# Patient Record
Sex: FEMALE | Race: WHITE | Employment: OTHER | URBAN - METROPOLITAN AREA
[De-identification: names, ages, dates, MRNs, and addresses within clinical notes are randomized per-mention and may not be internally consistent; named-entity substitution may affect disease eponyms.]

---

## 2023-03-06 ENCOUNTER — TELEPHONE (OUTPATIENT)
Dept: PRIMARY CARE | Facility: CLINIC | Age: 68
End: 2023-03-06
Payer: MEDICARE

## 2023-03-06 PROBLEM — R79.89 ELEVATED TSH: Status: ACTIVE | Noted: 2023-03-06

## 2023-03-06 PROBLEM — E78.5 HYPERLIPIDEMIA: Status: ACTIVE | Noted: 2023-03-06

## 2023-03-06 PROBLEM — M54.50 LUMBAGO: Status: ACTIVE | Noted: 2023-03-06

## 2023-03-06 PROBLEM — I10 HYPERTENSION: Status: ACTIVE | Noted: 2023-03-06

## 2023-03-06 PROBLEM — E55.9 VITAMIN D DEFICIENCY: Status: ACTIVE | Noted: 2023-03-06

## 2023-03-06 PROBLEM — R06.00 DYSPNEA: Status: ACTIVE | Noted: 2023-03-06

## 2023-03-06 PROBLEM — E03.9 HYPOTHYROIDISM: Status: ACTIVE | Noted: 2023-03-06

## 2023-03-06 RX ORDER — ATORVASTATIN CALCIUM 20 MG/1
20 TABLET, FILM COATED ORAL DAILY
COMMUNITY
Start: 2021-05-17 | End: 2024-03-06 | Stop reason: ALTCHOICE

## 2023-03-06 RX ORDER — LEVOTHYROXINE SODIUM 50 UG/1
1 TABLET ORAL DAILY
COMMUNITY
Start: 2020-03-30 | End: 2023-05-18

## 2023-03-06 RX ORDER — LOSARTAN POTASSIUM 25 MG/1
1 TABLET ORAL DAILY
COMMUNITY
Start: 2014-04-23 | End: 2023-06-05

## 2023-03-06 RX ORDER — VALACYCLOVIR HYDROCHLORIDE 1 G/1
1 TABLET, FILM COATED ORAL EVERY 12 HOURS PRN
COMMUNITY
Start: 2015-02-05 | End: 2023-11-30

## 2023-03-06 RX ORDER — HYDROCHLOROTHIAZIDE 12.5 MG/1
12.5 CAPSULE ORAL DAILY
COMMUNITY
Start: 2014-11-04 | End: 2023-06-05

## 2023-03-06 RX ORDER — MULTIVITAMIN
TABLET ORAL
COMMUNITY
Start: 2019-04-03

## 2023-03-06 RX ORDER — MV/FA/DHA/EPA/FISH OIL/SAW/GNK 400MCG-200
1 COMBINATION PACKAGE (EA) ORAL DAILY
COMMUNITY
Start: 2018-04-25

## 2023-03-07 ENCOUNTER — OFFICE VISIT (OUTPATIENT)
Dept: PRIMARY CARE | Facility: CLINIC | Age: 68
End: 2023-03-07
Payer: MEDICARE

## 2023-03-07 VITALS
TEMPERATURE: 98.1 F | SYSTOLIC BLOOD PRESSURE: 137 MMHG | HEART RATE: 66 BPM | HEIGHT: 61 IN | WEIGHT: 131 LBS | DIASTOLIC BLOOD PRESSURE: 82 MMHG | OXYGEN SATURATION: 98 % | BODY MASS INDEX: 24.73 KG/M2

## 2023-03-07 DIAGNOSIS — R31.21 ASYMPTOMATIC MICROSCOPIC HEMATURIA: ICD-10-CM

## 2023-03-07 DIAGNOSIS — R26.89 BALANCE PROBLEM: ICD-10-CM

## 2023-03-07 DIAGNOSIS — E78.00 HYPERCHOLESTEROLEMIA: ICD-10-CM

## 2023-03-07 DIAGNOSIS — R29.898 LEFT LEG WEAKNESS: ICD-10-CM

## 2023-03-07 DIAGNOSIS — I10 HYPERTENSION, UNSPECIFIED TYPE: ICD-10-CM

## 2023-03-07 DIAGNOSIS — R29.90 STROKE-LIKE SYMPTOMS: Primary | ICD-10-CM

## 2023-03-07 DIAGNOSIS — R27.8 ACUTE ATAXIA: ICD-10-CM

## 2023-03-07 LAB
POC APPEARANCE, URINE: CLEAR
POC BILIRUBIN, URINE: NEGATIVE
POC BLOOD, URINE: ABNORMAL
POC COLOR, URINE: YELLOW
POC GLUCOSE, URINE: NEGATIVE MG/DL
POC KETONES, URINE: NEGATIVE MG/DL
POC LEUKOCYTES, URINE: NEGATIVE
POC NITRITE,URINE: NEGATIVE
POC PH, URINE: 6.5 PH
POC PROTEIN, URINE: NEGATIVE MG/DL
POC SPECIFIC GRAVITY, URINE: 1.01
POC UROBILINOGEN, URINE: 0.2 EU/DL

## 2023-03-07 PROCEDURE — 99214 OFFICE O/P EST MOD 30 MIN: CPT | Performed by: FAMILY MEDICINE

## 2023-03-07 PROCEDURE — 3075F SYST BP GE 130 - 139MM HG: CPT | Performed by: FAMILY MEDICINE

## 2023-03-07 PROCEDURE — 3079F DIAST BP 80-89 MM HG: CPT | Performed by: FAMILY MEDICINE

## 2023-03-07 PROCEDURE — 81003 URINALYSIS AUTO W/O SCOPE: CPT | Performed by: FAMILY MEDICINE

## 2023-03-07 PROCEDURE — 1036F TOBACCO NON-USER: CPT | Performed by: FAMILY MEDICINE

## 2023-03-07 PROCEDURE — 1159F MED LIST DOCD IN RCRD: CPT | Performed by: FAMILY MEDICINE

## 2023-03-07 PROCEDURE — 1160F RVW MEDS BY RX/DR IN RCRD: CPT | Performed by: FAMILY MEDICINE

## 2023-03-07 RX ORDER — DOXYCYCLINE 50 MG/1
50 TABLET ORAL 2 TIMES DAILY
COMMUNITY

## 2023-03-08 ENCOUNTER — TELEPHONE (OUTPATIENT)
Dept: PRIMARY CARE | Facility: CLINIC | Age: 68
End: 2023-03-08
Payer: MEDICARE

## 2023-03-08 LAB — URINE CULTURE: NORMAL

## 2023-03-08 PROCEDURE — 87086 URINE CULTURE/COLONY COUNT: CPT

## 2023-03-21 PROBLEM — E78.00 HYPERCHOLESTEROLEMIA: Status: ACTIVE | Noted: 2023-01-23

## 2023-03-21 PROBLEM — R26.89 BALANCE PROBLEM: Status: ACTIVE | Noted: 2023-03-21

## 2023-03-21 PROBLEM — R29.90 STROKE-LIKE SYMPTOMS: Status: ACTIVE | Noted: 2023-03-21

## 2023-03-21 PROBLEM — R29.898 LEFT LEG WEAKNESS: Status: ACTIVE | Noted: 2023-03-21

## 2023-03-21 PROBLEM — Z86.0100 HISTORY OF COLONIC POLYPS: Status: ACTIVE | Noted: 2022-07-20

## 2023-03-21 PROBLEM — R27.8 ACUTE ATAXIA: Status: ACTIVE | Noted: 2023-03-21

## 2023-03-21 PROBLEM — R31.21 ASYMPTOMATIC MICROSCOPIC HEMATURIA: Status: ACTIVE | Noted: 2023-03-21

## 2023-03-21 PROBLEM — Z86.010 HISTORY OF COLONIC POLYPS: Status: ACTIVE | Noted: 2022-07-20

## 2023-03-21 RX ORDER — ASPIRIN 81 MG/1
81 TABLET ORAL DAILY
COMMUNITY
End: 2024-03-06 | Stop reason: ALTCHOICE

## 2023-03-21 RX ORDER — CLOBETASOL PROPIONATE 0.05 MG/G
GEL TOPICAL
COMMUNITY
Start: 2022-07-27

## 2023-03-21 RX ORDER — LEVOTHYROXINE SODIUM 50 UG/1
50 TABLET ORAL
COMMUNITY
Start: 2023-02-05 | End: 2023-03-21 | Stop reason: SDUPTHER

## 2023-03-21 RX ORDER — DAPSONE 50 MG/G
GEL TOPICAL 2 TIMES DAILY
COMMUNITY
Start: 2022-05-25

## 2023-03-21 RX ORDER — ATORVASTATIN CALCIUM 10 MG/1
10 TABLET, FILM COATED ORAL
COMMUNITY
Start: 2023-02-05 | End: 2023-09-11 | Stop reason: SDUPTHER

## 2023-03-21 ASSESSMENT — ENCOUNTER SYMPTOMS
OCCASIONAL FEELINGS OF UNSTEADINESS: 0
DEPRESSION: 0
LOSS OF SENSATION IN FEET: 0

## 2023-03-22 NOTE — PATIENT INSTRUCTIONS
Hospital records reviewed  There does not appear to be any sign of a stroke but there was a slight change on your CT scan that is probably related to blood pressure but I think it is worth having you see the neurologist just to be sure  Urinalysis is pretty unremarkable but I will repeat a urine culture to make sure there is no UTI  Follow-up after neurology consult  Please schedule your annual Medicare wellness exam

## 2023-03-22 NOTE — PROGRESS NOTES
Subjective   Patient ID: Qian Sanz is a 68 y.o. female who presents for follow up for stroke .  HPI  Very pleasant 68-year-old with history of hypertension hyperlipidemia awoke in the middle the night with limb weakness symptoms had occurred over the course of about 24 to 48 hours and then became acute with ataxia walking issues difficulty balancing balance issues and left leg weakness went to ER to be evaluated due to risk factors and strong family history of stroke.  Symptoms were very concerning to her.  Reviewed records from [facility] ER stroke work-up was negative except for very mild lacunar infarct possibility on CT.  There is also some hematuria but no urinary frequency or urgency work-up was negative she was observed for time neurology was consulted was discharged home she is here today for follow-up feels well has not had any further symptoms  Review of Systems  Constitutional: no chills, no fever and no night sweats.   Eyes: no blurred vision and no eyesight problems.   ENT: no hearing loss, no nasal congestion, no nasal discharge, no hoarseness and no sore throat.   Cardiovascular: no chest pain, no intermittent leg claudication, no lower extremity edema, no palpitations and no syncope.   Respiratory: no cough, no shortness of breath during exertion, no shortness of breath at rest and no wheezing.   Gastrointestinal: no abdominal pain, no blood in stools, no constipation, no diarrhea, no melena, no nausea, no rectal pain and no vomiting.   Genitourinary: no dysuria, no change in urinary frequency, no urinary hesitancy, no feelings of urinary urgency and no vaginal discharge.   Musculoskeletal: no arthralgias,  no back pain and no myalgias.   Integumentary: no new skin lesions and no rashes.   Neurological: no difficulty walking, no headache, no limb weakness, no numbness and no tingling.   Psychiatric: no anxiety, no depression, no anhedonia and no substance use disorders.   Endocrine: no  "recent weight gain and no recent weight loss.   Hematologic/Lymphatic: no tendency for easy bruising and no swollen glands .  Objective    /82   Pulse 66   Temp 36.7 °C (98.1 °F) (Oral)   Ht 1.549 m (5' 1\")   Wt 59.4 kg (131 lb)   SpO2 98%   BMI 24.75 kg/m²    Physical Exam  The patient appeared well nourished and normally developed. Vital signs as documented. Head exam is unremarkable. No scleral icterus or corneal arcus noted.  Pupils are equal round reactive to light extraocular movements are intact no hemorrhages noted on funduscopic exam mouth mucous membranes are moist no exudates ears canals clear TMs are gray pearly not injected nose no rhinorrhea or epistaxis Neck is without jugular venous distension, thyromegaly, or carotid bruits. Carotid upstrokes are brisk bilaterally. Lungs are clear to auscultation and percussion. Cardiac exam reveals the PMI to be normally sized and situated. Rhythm is regular. First and second heart sounds normal. No murmurs, rubs or gallops. Abdominal exam reveals normal bowel sounds, no masses, no organomegaly and no aortic enlargement. Extremities are nonedematous and both femoral and pedal pulses are normal.  Neurologic exam DTRs are equal bilaterally no focal deficits strength is symmetrical heme lymph no palpable lymph nodes in the neck axilla or groin  Assessment/Plan   Problem List Items Addressed This Visit          Nervous    Acute ataxia    Left leg weakness    Stroke-like symptoms - Primary       Genitourinary    Asymptomatic microscopic hematuria    Relevant Orders    POCT UA Automated manually resulted (Completed)    Urine Culture       Other    Balance problem       Follow-up hospital visit for strokelike symptoms ataxia balance issues and transient limb weakness in a patient with a strong family history of strokes and hyperlipidemia and hypertension.  Stroke work-up was negative.  CT did show some microangiopathic and possible lacunar infarct changes that " I think warrant a neurology evaluation follow-up.  There was some hematuria will repeat urine and send for culture otherwise neurologic exam is completely intact and normal and blood pressure control is good we will continue on statin aspirin and blood pressure at this time  Schedule annual Medicare wellness exam    Nicolette Alvarenga MD

## 2023-04-06 ENCOUNTER — TELEPHONE (OUTPATIENT)
Dept: PRIMARY CARE | Facility: CLINIC | Age: 68
End: 2023-04-06
Payer: MEDICARE

## 2023-04-06 NOTE — TELEPHONE ENCOUNTER
Pt called to tell us to tell you she sent you a my chart msg and to check it.    She also said she stopped the asprin 81 mg EC tablet because she said it was hurting.

## 2023-09-02 DIAGNOSIS — I10 HYPERTENSION, UNSPECIFIED TYPE: ICD-10-CM

## 2023-09-07 RX ORDER — HYDROCHLOROTHIAZIDE 12.5 MG/1
CAPSULE ORAL
Qty: 90 CAPSULE | Refills: 0 | Status: SHIPPED | OUTPATIENT
Start: 2023-09-07 | End: 2023-09-11 | Stop reason: SDUPTHER

## 2023-09-07 RX ORDER — LOSARTAN POTASSIUM 25 MG/1
TABLET ORAL
Qty: 90 TABLET | Refills: 0 | Status: SHIPPED | OUTPATIENT
Start: 2023-09-07 | End: 2023-12-11

## 2023-09-11 ENCOUNTER — OFFICE VISIT (OUTPATIENT)
Dept: PRIMARY CARE | Facility: CLINIC | Age: 68
End: 2023-09-11
Payer: MEDICARE

## 2023-09-11 VITALS
SYSTOLIC BLOOD PRESSURE: 120 MMHG | DIASTOLIC BLOOD PRESSURE: 70 MMHG | HEART RATE: 65 BPM | TEMPERATURE: 97.5 F | WEIGHT: 128 LBS | BODY MASS INDEX: 24.19 KG/M2

## 2023-09-11 DIAGNOSIS — I10 HYPERTENSION, UNSPECIFIED TYPE: ICD-10-CM

## 2023-09-11 DIAGNOSIS — Z12.31 ENCOUNTER FOR SCREENING MAMMOGRAM FOR BREAST CANCER: ICD-10-CM

## 2023-09-11 DIAGNOSIS — Z00.00 MEDICARE ANNUAL WELLNESS VISIT, SUBSEQUENT: Primary | ICD-10-CM

## 2023-09-11 DIAGNOSIS — E03.9 HYPOTHYROIDISM, UNSPECIFIED TYPE: ICD-10-CM

## 2023-09-11 DIAGNOSIS — E78.00 HYPERCHOLESTEROLEMIA: ICD-10-CM

## 2023-09-11 DIAGNOSIS — T14.8XXA PUNCTURE WOUND: ICD-10-CM

## 2023-09-11 DIAGNOSIS — R73.03 PREDIABETES: ICD-10-CM

## 2023-09-11 PROBLEM — E78.5 HYPERLIPIDEMIA: Status: RESOLVED | Noted: 2023-03-06 | Resolved: 2023-09-11

## 2023-09-11 PROCEDURE — 1036F TOBACCO NON-USER: CPT | Performed by: FAMILY MEDICINE

## 2023-09-11 PROCEDURE — 90715 TDAP VACCINE 7 YRS/> IM: CPT | Performed by: FAMILY MEDICINE

## 2023-09-11 PROCEDURE — G0439 PPPS, SUBSEQ VISIT: HCPCS | Performed by: FAMILY MEDICINE

## 2023-09-11 PROCEDURE — 90471 IMMUNIZATION ADMIN: CPT | Performed by: FAMILY MEDICINE

## 2023-09-11 PROCEDURE — 1170F FXNL STATUS ASSESSED: CPT | Performed by: FAMILY MEDICINE

## 2023-09-11 PROCEDURE — 1160F RVW MEDS BY RX/DR IN RCRD: CPT | Performed by: FAMILY MEDICINE

## 2023-09-11 PROCEDURE — 1159F MED LIST DOCD IN RCRD: CPT | Performed by: FAMILY MEDICINE

## 2023-09-11 PROCEDURE — 1126F AMNT PAIN NOTED NONE PRSNT: CPT | Performed by: FAMILY MEDICINE

## 2023-09-11 PROCEDURE — 99213 OFFICE O/P EST LOW 20 MIN: CPT | Performed by: FAMILY MEDICINE

## 2023-09-11 PROCEDURE — 3078F DIAST BP <80 MM HG: CPT | Performed by: FAMILY MEDICINE

## 2023-09-11 PROCEDURE — 3074F SYST BP LT 130 MM HG: CPT | Performed by: FAMILY MEDICINE

## 2023-09-11 RX ORDER — AMOXICILLIN AND CLAVULANATE POTASSIUM 875; 125 MG/1; MG/1
875 TABLET, FILM COATED ORAL 2 TIMES DAILY
Qty: 20 TABLET | Refills: 0 | Status: SHIPPED | OUTPATIENT
Start: 2023-09-11 | End: 2023-09-21

## 2023-09-11 RX ORDER — HYDROCHLOROTHIAZIDE 12.5 MG/1
12.5 CAPSULE ORAL DAILY
Qty: 90 CAPSULE | Refills: 3 | Status: SHIPPED | OUTPATIENT
Start: 2023-09-11

## 2023-09-11 RX ORDER — LEVOTHYROXINE SODIUM 50 UG/1
50 TABLET ORAL DAILY
Qty: 90 TABLET | Refills: 3 | Status: SHIPPED | OUTPATIENT
Start: 2023-09-11

## 2023-09-11 RX ORDER — ATORVASTATIN CALCIUM 10 MG/1
10 TABLET, FILM COATED ORAL DAILY
Qty: 90 TABLET | Refills: 3 | Status: SHIPPED | OUTPATIENT
Start: 2023-09-11 | End: 2024-03-11 | Stop reason: SDUPTHER

## 2023-09-11 ASSESSMENT — PATIENT HEALTH QUESTIONNAIRE - PHQ9
2. FEELING DOWN, DEPRESSED OR HOPELESS: NOT AT ALL
1. LITTLE INTEREST OR PLEASURE IN DOING THINGS: NOT AT ALL
SUM OF ALL RESPONSES TO PHQ9 QUESTIONS 1 AND 2: 0
2. FEELING DOWN, DEPRESSED OR HOPELESS: NOT AT ALL
SUM OF ALL RESPONSES TO PHQ9 QUESTIONS 1 AND 2: 0
1. LITTLE INTEREST OR PLEASURE IN DOING THINGS: NOT AT ALL

## 2023-09-11 ASSESSMENT — ACTIVITIES OF DAILY LIVING (ADL)
DOING_HOUSEWORK: INDEPENDENT
DRESSING: INDEPENDENT
TAKING_MEDICATION: INDEPENDENT
MANAGING_FINANCES: INDEPENDENT
GROCERY_SHOPPING: INDEPENDENT
BATHING: INDEPENDENT

## 2023-09-11 NOTE — PROGRESS NOTES
Subjective   Reason for Visit: Qian Sanz is an 68 y.o. female here for a Medicare Wellness visit.     Past Medical, Surgical, and Family History reviewed and updated in chart.    Reviewed all medications by prescribing practitioner or clinical pharmacist (such as prescriptions, OTCs, herbal therapies and supplements) and documented in the medical record.    Recent puncture wound  Some redness around the area  Tetanus not updated  No fever chills or systemic symptoms  She also feels somewhat lightheaded when she eats nothing that stops her it lasts for just a short time and then she is feeling better if she rests she is fine she is not fainting does not have any chest pain no lateralizing weakness  Puncture wound 1 week ago still red nonhealing  No fever or systemic symptoms  Very active and independent also had Medicare wellness today        Patient Self Assessment of Health Status  Patient Self Assessment: Good    Nutrition and Exercise  Current Diet: Well Balanced Diet  Adequate Fluid Intake: Yes  Caffeine: Yes  Exercise Frequency: Regularly    Functional Ability/Level of Safety  Cognitive Impairment Observed: No cognitive impairment observed  Cognitive Impairment Reported: No cognitive impairment reported by patient or family    Home Safety Risk Factors: None         Patient Care Team:  Nicolette Alvarenga MD as PCP - General  Nicolette Alvarenga MD as PCP - [facility] Attributed Provider     Review of Systems  Constitutional: no chills, no fever and no night sweats.   Eyes: no blurred vision and no eyesight problems.   ENT: no hearing loss, no nasal congestion, no nasal discharge, no hoarseness and no sore throat.   Cardiovascular: no chest pain, no intermittent leg claudication, no lower extremity edema, no palpitations and no syncope.   Respiratory: no cough, no shortness of breath during exertion, no shortness of breath at rest and no wheezing.   Gastrointestinal: no abdominal pain, no blood in stools, no  constipation, no diarrhea, no melena, no nausea, no rectal pain and no vomiting.   Genitourinary: no dysuria, no change in urinary frequency, no urinary hesitancy, no feelings of urinary urgency and no vaginal discharge.   Musculoskeletal: no arthralgias,  no back pain and no myalgias.   Integumentary: no new skin lesions and no rashes.   Neurological: no difficulty walking, no headache, no limb weakness, no numbness and no tingling.   Psychiatric: no anxiety, no depression, no anhedonia and no substance use disorders.   Endocrine: no recent weight gain and no recent weight loss.   Hematologic/Lymphatic: no tendency for easy bruising and no swollen glands .    Medicare Wellness Visit Billing Compliance Not Met    *This is a visual tool to show completion of required items on the day of the visit. Green checks will only appear on the date of visit.      Objective   Vitals:  /70   Pulse 65   Temp 36.4 °C (97.5 °F)   Wt 58.1 kg (128 lb)   BMI 24.19 kg/m²       Physical Exam  The patient appeared well nourished and normally developed. Vital signs as documented. Head exam is unremarkable. No scleral icterus or corneal arcus noted.  Pupils are equal round reactive to light extraocular movements are intact no hemorrhages noted on funduscopic exam mouth mucous membranes are moist no exudates ears canals clear TMs are gray pearly not injected nose no rhinorrhea or epistaxis Neck is without jugular venous distension, thyromegaly, or carotid bruits. Carotid upstrokes are brisk bilaterally. Lungs are clear to auscultation and percussion. Cardiac exam reveals the PMI to be normally sized and situated. Rhythm is regular. First and second heart sounds normal. No murmurs, rubs or gallops. Abdominal exam reveals normal bowel sounds, no masses, no organomegaly and no aortic enlargement. Extremities are nonedematous and both femoral and pedal pulses are normal.  Neurologic exam DTRs are equal bilaterally no focal deficits  strength is symmetrical heme lymph no palpable lymph nodes in the neck axilla or groin  Puncture wound no evidence of cellulitis minimal tenderness  Assessment/Plan   Problem List Items Addressed This Visit       Hypertension    Relevant Medications    hydroCHLOROthiazide (Microzide) 12.5 mg capsule    Other Relevant Orders    Comprehensive Metabolic Panel    Hypothyroidism    Relevant Medications    levothyroxine (Synthroid, Levoxyl) 50 mcg tablet    Other Relevant Orders    TSH    Hypercholesterolemia    Relevant Medications    atorvastatin (Lipitor) 10 mg tablet    Other Relevant Orders    Lipid Panel    Prediabetes    Relevant Orders    Hemoglobin A1C    Medicare annual wellness visit, subsequent - Primary    Current Assessment & Plan     Continue annual exams         Puncture wound    Current Assessment & Plan     Puncture wound  Some inflammation and superficial infection  Possible early cellulitis  Tetanus shot today  Cleaning instructions given          Other Visit Diagnoses       Encounter for screening mammogram for breast cancer        Relevant Orders    BI mammo bilateral screening tomosynthesis

## 2023-09-13 LAB
NON-UH HIE A/G RATIO: 1.4
NON-UH HIE ALB: 4 G/DL (ref 3.4–5)
NON-UH HIE ALK PHOS: 72 UNIT/L (ref 46–116)
NON-UH HIE BILIRUBIN, TOTAL: 1 MG/DL (ref 0.2–1)
NON-UH HIE BUN/CREAT RATIO: 22.5
NON-UH HIE BUN: 18 MG/DL (ref 9–23)
NON-UH HIE CALCIUM: 10.6 MG/DL (ref 8.7–10.4)
NON-UH HIE CALCULATED LDL CHOLESTEROL: 97 MG/DL (ref 60–130)
NON-UH HIE CALCULATED OSMOLALITY: 285 MOSM/KG (ref 275–295)
NON-UH HIE CHLORIDE: 106 MMOL/L (ref 98–107)
NON-UH HIE CHOLESTEROL: 183 MG/DL (ref 100–200)
NON-UH HIE CO2, VENOUS: 30 MMOL/L (ref 20–31)
NON-UH HIE CREATININE: 0.8 MG/DL (ref 0.5–0.8)
NON-UH HIE GFR AA: >60
NON-UH HIE GLOBULIN: 2.8 G/DL
NON-UH HIE GLOMERULAR FILTRATION RATE: >60 ML/MIN/1.73M?
NON-UH HIE GLUCOSE: 89 MG/DL (ref 74–106)
NON-UH HIE GOT: 20 UNIT/L (ref 15–37)
NON-UH HIE GPT: 15 UNIT/L (ref 10–49)
NON-UH HIE HDL CHOLESTEROL: 71 MG/DL (ref 40–60)
NON-UH HIE HGB A1C: 5.2 %
NON-UH HIE K: 3.7 MMOL/L (ref 3.5–5.1)
NON-UH HIE NA: 142 MMOL/L (ref 135–145)
NON-UH HIE TOTAL CHOL/HDL CHOL RATIO: 2.6
NON-UH HIE TOTAL PROTEIN: 6.8 G/DL (ref 5.7–8.2)
NON-UH HIE TRIGLYCERIDES: 75 MG/DL (ref 30–150)
NON-UH HIE TSH: 3.03 UIU/ML (ref 0.55–4.78)

## 2023-09-18 ENCOUNTER — TELEPHONE (OUTPATIENT)
Dept: PRIMARY CARE | Facility: CLINIC | Age: 68
End: 2023-09-18
Payer: MEDICARE

## 2023-09-18 NOTE — TELEPHONE ENCOUNTER
Patient needs the results of her blood work. Patient would like to know if to know if she is due for a mammogram.

## 2023-09-22 NOTE — TELEPHONE ENCOUNTER
Call patient  Her blood work is great and I annotated on the report so that she can read the comments  Her calcium was slightly elevated if she is taking a calcium supplement I would like her to stop taking it and repeat the calcium in a few weeks  The rest of her blood work was great  I put an order for her mammogram in her chart she is due in February

## 2023-09-26 ENCOUNTER — TELEPHONE (OUTPATIENT)
Dept: PRIMARY CARE | Facility: CLINIC | Age: 68
End: 2023-09-26
Payer: MEDICARE

## 2023-09-26 NOTE — TELEPHONE ENCOUNTER
Pt got a message saying she needed PTH tested, she doesn't know what that is and if she needs an order for it.  Please call her

## 2023-09-27 DIAGNOSIS — E83.52 SERUM CALCIUM ELEVATED: ICD-10-CM

## 2023-09-27 LAB — NON-UH HIE I-PTH: 95 PG/ML (ref 18.4–80.1)

## 2023-09-28 PROBLEM — R26.89 BALANCE PROBLEM: Status: RESOLVED | Noted: 2023-03-21 | Resolved: 2023-09-28

## 2023-09-28 PROBLEM — G45.9 TIA (TRANSIENT ISCHEMIC ATTACK): Status: ACTIVE | Noted: 2023-09-28

## 2023-09-28 PROBLEM — D12.6 BENIGN NEOPLASM OF COLON: Status: RESOLVED | Noted: 2022-11-14 | Resolved: 2023-09-28

## 2023-09-28 PROBLEM — R10.31 RIGHT LOWER QUADRANT PAIN: Status: RESOLVED | Noted: 2022-11-14 | Resolved: 2023-09-28

## 2023-09-28 PROBLEM — R29.90 STROKE-LIKE SYMPTOMS: Status: RESOLVED | Noted: 2023-03-21 | Resolved: 2023-09-28

## 2023-09-28 PROBLEM — R27.8 ACUTE ATAXIA: Status: RESOLVED | Noted: 2023-03-21 | Resolved: 2023-09-28

## 2023-09-28 PROBLEM — R29.898 LEFT LEG WEAKNESS: Status: RESOLVED | Noted: 2023-03-21 | Resolved: 2023-09-28

## 2023-09-28 PROBLEM — G45.9 TIA (TRANSIENT ISCHEMIC ATTACK): Status: RESOLVED | Noted: 2023-09-28 | Resolved: 2023-09-28

## 2023-09-28 PROBLEM — D12.6 BENIGN NEOPLASM OF COLON: Status: ACTIVE | Noted: 2022-11-14

## 2023-09-28 PROBLEM — R06.00 DYSPNEA: Status: RESOLVED | Noted: 2023-03-06 | Resolved: 2023-09-28

## 2023-09-28 PROBLEM — T14.8XXA PUNCTURE WOUND: Status: ACTIVE | Noted: 2023-09-28

## 2023-09-28 PROBLEM — Z00.00 MEDICARE ANNUAL WELLNESS VISIT, SUBSEQUENT: Status: ACTIVE | Noted: 2023-09-28

## 2023-09-28 NOTE — ASSESSMENT & PLAN NOTE
Puncture wound  Some inflammation and superficial infection  Possible early cellulitis  Tetanus shot today  Cleaning instructions given

## 2023-09-28 NOTE — PATIENT INSTRUCTIONS
Today you had your annual Medicare wellness exam and you were seen for evaluation of a puncture wound tetanus shot is given  No evidence of infection  I recommend taking the antibiotic  Please call if you need anything

## 2023-10-04 ENCOUNTER — TELEPHONE (OUTPATIENT)
Dept: PRIMARY CARE | Facility: CLINIC | Age: 68
End: 2023-10-04
Payer: MEDICARE

## 2023-10-04 NOTE — TELEPHONE ENCOUNTER
Patient made an appointment for a mammogram Thursday 12th with [facility], however the  system keeps calling her to make an appointment and she wants them to stop.  She also wants to know if she should get a bone density test.  Her last test was 2020 but she is not sure if she needs one with her PTH level.

## 2023-10-15 DIAGNOSIS — E21.3 HYPERPARATHYROIDISM (MULTI): Primary | ICD-10-CM

## 2023-10-16 ENCOUNTER — TELEPHONE (OUTPATIENT)
Dept: PRIMARY CARE | Facility: CLINIC | Age: 68
End: 2023-10-16
Payer: MEDICARE

## 2023-11-15 ENCOUNTER — TELEPHONE (OUTPATIENT)
Dept: PRIMARY CARE | Facility: CLINIC | Age: 68
End: 2023-11-15
Payer: MEDICARE

## 2023-11-15 NOTE — TELEPHONE ENCOUNTER
Patient would like to get a bone density test.  She is afraid she is crossing over the level before osteoporosis.

## 2023-11-16 DIAGNOSIS — Z78.0 POSTMENOPAUSAL STATE: Primary | ICD-10-CM

## 2023-11-21 ENCOUNTER — ANCILLARY PROCEDURE (OUTPATIENT)
Dept: RADIOLOGY | Facility: CLINIC | Age: 68
End: 2023-11-21
Payer: MEDICARE

## 2023-11-21 DIAGNOSIS — Z78.0 POSTMENOPAUSAL STATE: ICD-10-CM

## 2023-11-21 PROCEDURE — 77080 DXA BONE DENSITY AXIAL: CPT

## 2023-11-21 PROCEDURE — 77080 DXA BONE DENSITY AXIAL: CPT | Performed by: STUDENT IN AN ORGANIZED HEALTH CARE EDUCATION/TRAINING PROGRAM

## 2023-11-30 DIAGNOSIS — R31.21 ASYMPTOMATIC MICROSCOPIC HEMATURIA: ICD-10-CM

## 2023-11-30 RX ORDER — VALACYCLOVIR HYDROCHLORIDE 1 G/1
1000 TABLET, FILM COATED ORAL EVERY 12 HOURS PRN
Qty: 14 TABLET | Refills: 0 | Status: SHIPPED | OUTPATIENT
Start: 2023-11-30 | End: 2024-03-29

## 2023-12-10 DIAGNOSIS — I10 HYPERTENSION, UNSPECIFIED TYPE: ICD-10-CM

## 2023-12-11 DIAGNOSIS — I10 HYPERTENSION, UNSPECIFIED TYPE: ICD-10-CM

## 2023-12-11 RX ORDER — LOSARTAN POTASSIUM 25 MG/1
25 TABLET ORAL DAILY
Qty: 90 TABLET | Refills: 0 | Status: SHIPPED | OUTPATIENT
Start: 2023-12-11 | End: 2023-12-13

## 2023-12-13 RX ORDER — LOSARTAN POTASSIUM 25 MG/1
25 TABLET ORAL DAILY
Qty: 90 TABLET | Refills: 0 | Status: SHIPPED | OUTPATIENT
Start: 2023-12-13 | End: 2024-03-11

## 2024-01-24 ENCOUNTER — APPOINTMENT (OUTPATIENT)
Dept: ENDOCRINOLOGY | Facility: CLINIC | Age: 69
End: 2024-01-24
Payer: MEDICARE

## 2024-03-06 ENCOUNTER — OFFICE VISIT (OUTPATIENT)
Dept: PRIMARY CARE | Facility: CLINIC | Age: 69
End: 2024-03-06
Payer: MEDICARE

## 2024-03-06 VITALS
SYSTOLIC BLOOD PRESSURE: 134 MMHG | TEMPERATURE: 98.3 F | BODY MASS INDEX: 24.73 KG/M2 | WEIGHT: 131 LBS | HEIGHT: 61 IN | DIASTOLIC BLOOD PRESSURE: 74 MMHG | HEART RATE: 56 BPM

## 2024-03-06 DIAGNOSIS — Z12.31 ENCOUNTER FOR SCREENING MAMMOGRAM FOR BREAST CANCER: ICD-10-CM

## 2024-03-06 DIAGNOSIS — Z11.59 NEED FOR HEPATITIS C SCREENING TEST: ICD-10-CM

## 2024-03-06 DIAGNOSIS — R39.9 UTI SYMPTOMS: ICD-10-CM

## 2024-03-06 DIAGNOSIS — R73.03 PREDIABETES: ICD-10-CM

## 2024-03-06 DIAGNOSIS — R23.3 EASY BRUISING: Primary | ICD-10-CM

## 2024-03-06 PROBLEM — M67.449 GANGLION OF HAND: Status: ACTIVE | Noted: 2024-03-06

## 2024-03-06 PROBLEM — H91.90 HEARING LOSS: Status: ACTIVE | Noted: 2024-03-06

## 2024-03-06 PROBLEM — M25.559 ARTHRALGIA OF HIP: Status: ACTIVE | Noted: 2024-03-06

## 2024-03-06 PROBLEM — L25.9 CONTACT DERMATITIS: Status: RESOLVED | Noted: 2024-03-06 | Resolved: 2024-03-06

## 2024-03-06 PROBLEM — L72.3 INFECTED SEBACEOUS CYST: Status: RESOLVED | Noted: 2024-03-06 | Resolved: 2024-03-06
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
## 2024-03-06 PROBLEM — S76.319A STRAIN OF HAMSTRING MUSCLE: Status: RESOLVED | Noted: 2024-03-06 | Resolved: 2024-03-06

## 2024-03-06 PROBLEM — R20.2 PARESTHESIAS: Status: RESOLVED | Noted: 2024-03-06 | Resolved: 2024-03-06

## 2024-03-06 PROBLEM — R42 DIZZINESS: Status: RESOLVED | Noted: 2024-03-06 | Resolved: 2024-03-06

## 2024-03-06 PROBLEM — E66.3 OVERWEIGHT WITH BODY MASS INDEX (BMI) 25.0-29.9: Status: RESOLVED | Noted: 2024-03-06 | Resolved: 2024-03-06

## 2024-03-06 PROBLEM — M77.40 METATARSALGIA: Status: RESOLVED | Noted: 2024-03-06 | Resolved: 2024-03-06

## 2024-03-06 PROBLEM — R42 LIGHTHEADEDNESS: Status: RESOLVED | Noted: 2024-03-06 | Resolved: 2024-03-06

## 2024-03-06 PROBLEM — M25.569 KNEE PAIN: Status: ACTIVE | Noted: 2024-03-06

## 2024-03-06 PROBLEM — M46.1 INFLAMMATION OF SACROILIAC JOINT (CMS-HCC): Status: RESOLVED | Noted: 2024-03-06 | Resolved: 2024-03-06

## 2024-03-06 PROBLEM — L08.9 INFECTED SEBACEOUS CYST: Status: RESOLVED | Noted: 2024-03-06 | Resolved: 2024-03-06

## 2024-03-06 LAB
POC APPEARANCE, URINE: CLEAR
POC BILIRUBIN, URINE: NEGATIVE
POC BLOOD, URINE: ABNORMAL
POC COLOR, URINE: YELLOW
POC GLUCOSE, URINE: NEGATIVE MG/DL
POC KETONES, URINE: NEGATIVE MG/DL
POC LEUKOCYTES, URINE: NEGATIVE
POC NITRITE,URINE: NEGATIVE
POC PH, URINE: 7 PH
POC PROTEIN, URINE: NEGATIVE MG/DL
POC SPECIFIC GRAVITY, URINE: <=1.005
POC UROBILINOGEN, URINE: 0.2 EU/DL

## 2024-03-06 PROCEDURE — 90670 PCV13 VACCINE IM: CPT | Performed by: FAMILY MEDICINE

## 2024-03-06 PROCEDURE — G0009 ADMIN PNEUMOCOCCAL VACCINE: HCPCS | Performed by: FAMILY MEDICINE

## 2024-03-06 PROCEDURE — 1159F MED LIST DOCD IN RCRD: CPT | Performed by: FAMILY MEDICINE

## 2024-03-06 PROCEDURE — 81003 URINALYSIS AUTO W/O SCOPE: CPT | Performed by: FAMILY MEDICINE

## 2024-03-06 PROCEDURE — 87086 URINE CULTURE/COLONY COUNT: CPT

## 2024-03-06 PROCEDURE — 99213 OFFICE O/P EST LOW 20 MIN: CPT | Performed by: FAMILY MEDICINE

## 2024-03-06 PROCEDURE — 1158F ADVNC CARE PLAN TLK DOCD: CPT | Performed by: FAMILY MEDICINE

## 2024-03-06 PROCEDURE — 1160F RVW MEDS BY RX/DR IN RCRD: CPT | Performed by: FAMILY MEDICINE

## 2024-03-06 PROCEDURE — 3078F DIAST BP <80 MM HG: CPT | Performed by: FAMILY MEDICINE

## 2024-03-06 PROCEDURE — 3075F SYST BP GE 130 - 139MM HG: CPT | Performed by: FAMILY MEDICINE

## 2024-03-06 PROCEDURE — 1036F TOBACCO NON-USER: CPT | Performed by: FAMILY MEDICINE

## 2024-03-06 PROCEDURE — 1123F ACP DISCUSS/DSCN MKR DOCD: CPT | Performed by: FAMILY MEDICINE

## 2024-03-06 ASSESSMENT — PATIENT HEALTH QUESTIONNAIRE - PHQ9
SUM OF ALL RESPONSES TO PHQ9 QUESTIONS 1 AND 2: 0
1. LITTLE INTEREST OR PLEASURE IN DOING THINGS: NOT AT ALL
2. FEELING DOWN, DEPRESSED OR HOPELESS: NOT AT ALL

## 2024-03-06 NOTE — PROGRESS NOTES
[address] [facility] 
 
 
 [address] En[address] 69 [address] [address] [phone] Patient: Fredy Farley MRN: [id number] BTE:[id number] Visit Information Date & Time Provider Department Dept. Phone Encounter #  
 4/2/2018  1:30 PM Kamilla Galdamez [phone] [id number] Follow-up Instructions Return in about 4 weeks (around 4/30/2018). Follow-up and Disposition History Your Appointments 5/1/2018  1:50 PM  
[facility] ([address]) Appt Note: 1 mo  follow up   PT  
 [address] Enrique 69 [address] [address] [address] [address] 6/13/2018  9:20 AM  
FOLLOW UP 10 with Crystal Fuller MD  
[facility] ([address]) Appt Note: [address] [id number] [phone] Upcoming Health Maintenance Date Due  
 EYE EXAM RETINAL OR DILATED Q1 11/4/1944 DTaP/Tdap/Td series (1 - Tdap) 11/4/1955 ZOSTER VACCINE AGE 60> 9/4/1994 GLAUCOMA SCREENING Q2Y 11/4/1999 MEDICARE YEARLY EXAM 8/11/2018 HEMOGLOBIN A1C Q6M 9/1/2018 MICROALBUMIN Q1 11/13/2018 FOOT EXAM Q1 3/1/2019 LIPID PANEL Q1 3/1/2019 Allergies as of 4/2/2018  Review Complete On: 4/2/2018 By: Crystal Fuller MD  
 No Known Allergies Current Immunizations  Reviewed on 12/17/2014 Name Date Influenza High Dose Vaccine PF 9/29/2017 Influenza Vaccine 10/25/2016, 10/28/2015, 11/1/2014 Pneumococcal Conjugate (PCV-13) 10/30/2015 Pneumococcal Polysaccharide (PPSV-23) 10/7/2011 Pneumococcal Vaccine (Unspecified Type) 11/1/2011 Not reviewed this visit You Were Diagnosed With   
  
 Codes Comments Hypertension with renal disease    -  Primary ICD-10-CM: I12.9 Subjective   Patient ID: Qian Sanz is a 69 y.o. female who presents for Follow-up (Blood vessels in her hands keep breaking, muscle loss, and left flank pain.  Also randomly gets a muscle pain in her right shin.  No falls or injuries. Parathyroid issues, she saw Endo who recommended surgery to remove her gland. ) and UTI (Urine frequency and slight irritation for a few weeks. ).  Very pleasant 69-year-old for follow-up various issues  Her calcium was high had a visit with an endocrinologist and parathyroid was elevated she is going to be having a follow-up appointment  Her blood sugar was high and there was concern about why that would be and whether or not she had diabetes and she was wondering if any further testing needs to be done  She has noticed that blood vessels are breaking in her hands she is having some easy bruising she is on several medications but is not currently taking a baby aspirin no bleeding from her groin gums no blood in the urine or stool she also has noticed some decreased muscle strength and some chronic left flank pain she also gets occasional pain in her right shin that she was concerned about but it is not stopping her from doing anything she has been very active it was recommended that she has surgery to remove her parathyroid glands  She is also had some slight vaginal irritation and some urinary frequency and UTI symptoms for the past few weeks no fever or chills no blood in the urine but has had urinary frequency        Review of Systems  Constitutional: no chills, no fever and no night sweats.   Eyes: no blurred vision and no eyesight problems.   ENT: no hearing loss, no nasal congestion, no nasal discharge, no hoarseness and no sore throat.   Cardiovascular: no chest pain, no intermittent leg claudication, no lower extremity edema, no palpitations and no syncope.   Respiratory: no cough, no shortness of breath during exertion, no shortness of breath at rest and no  "wheezing.   Gastrointestinal: no abdominal pain, no blood in stools, no constipation, no diarrhea, no melena, no nausea, no rectal pain and no vomiting.   Genitourinary: no dysuria, no change in urinary frequency, no urinary hesitancy, no feelings of urinary urgency and no vaginal discharge.   Musculoskeletal: no arthralgias,  no back pain and no myalgias.   Integumentary: no new skin lesions and no rashes.   Neurological: no difficulty walking, no headache, no limb weakness, no numbness and no tingling.   Psychiatric: no anxiety, no depression, no anhedonia and no substance use disorders.   Endocrine: no recent weight gain and no recent weight loss.   Hematologic/Lymphatic: no tendency for easy bruising and no swollen glands .    Objective    /74   Pulse 56   Temp 36.8 °C (98.3 °F)   Ht 1.549 m (5' 1\")   Wt 59.4 kg (131 lb)   BMI 24.75 kg/m²    Physical Exam  The patient appeared well nourished and normally developed. Vital signs as documented. Head exam is unremarkable. No scleral icterus or corneal arcus noted.  Pupils are equal round reactive to light extraocular movements are intact no hemorrhages noted on funduscopic exam mouth mucous membranes are moist no exudates ears canals clear TMs are gray pearly not injected nose no rhinorrhea or epistaxis Neck is without jugular venous distension, thyromegaly, or carotid bruits. Carotid upstrokes are brisk bilaterally. Lungs are clear to auscultation and percussion. Cardiac exam reveals the PMI to be normally sized and situated. Rhythm is regular. First and second heart sounds normal. No murmurs, rubs or gallops. Abdominal exam reveals normal bowel sounds, no masses, no organomegaly and no aortic enlargement. Extremities are nonedematous and both femoral and pedal pulses are normal.  Neurologic exam DTRs are equal bilaterally no focal deficits strength is symmetrical heme lymph no palpable lymph nodes in the neck axilla or groin    Assessment/Plan   Problem " ICD-9-CM: 403.90 CKD (chronic kidney disease) stage 3, GFR 30-59 ml/min     ICD-10-CM: N18.3 ICD-9-CM: 585. 3 Paroxysmal atrial fibrillation (HCC)     ICD-10-CM: I48.0 ICD-9-CM: 427.31 Cardiomyopathy, unspecified type ([facility].)     ICD-10-CM: I42.9 ICD-9-CM: 425.4 Ischemia of left lower extremity     ICD-10-CM: I99.8 ICD-9-CM: 459.9 Vitals BP Pulse Temp Resp Height(growth percentile) Weight(growth percentile) 124/56 (BP 1 Location: Left arm, BP Patient Position: Sitting) 60 97.6 °F (36.4 °C) (Oral) 15 5' 7\" (1.702 m) 176 lb 3.2 oz (79.9 kg) SpO2 BMI Smoking Status 97% 27.6 kg/m2 Former Smoker Vitals History BMI and BSA Data Body Mass Index Body Surface Area  
 27.6 kg/m 2 1.94 m 2 Preferred Pharmacy Pharmacy Name Phone [id number] [phone] [facility] [phone] Your Updated Medication List  
  
   
This list is accurate as of 4/2/18  1:54 PM.  Always use your most recent med list.  
  
  
  
  
 ascorbic acid (vitamin C) 250 mg tablet Commonly known as:  VITAMIN C  
TAKE 1 TABLET BY MOUTH 3 TIMES A DAY  
  
 atorvastatin 40 mg tablet Commonly known as:  LIPITOR  
TAKE 1 TABLET BY MOUTH EVERY DAY  
  
 carvedilol 3.125 mg tablet Commonly known as:  COREG  
TAKE 1 TABLET BY MOUTH TWICE A DAY  
  
 FABB 2.2-25-1 mg Tab Generic drug:  folic acid-vit [id number] W62 TAKE 1 TABLET BY MOUTH DAILY ferrous sulfate 324 mg (65 mg iron) tablet TAKE 1 TABLET BY MOUTH 3 TIMES DAILY  
  
 furosemide 40 mg tablet Commonly known as:  LASIX TAKE 1 TABLET BY MOUTH DAILY  
  
 levothyroxine 125 mcg tablet Commonly known as:  SYNTHROID Take 125 mcg by mouth Daily (before breakfast). losartan 100 mg tablet Commonly known as:  COZAAR  
TAKE 1 TABLET BY MOUTH EVERY DAY  
  
 warfarin 2.5 mg tablet Commonly known as:  COUMADIN  
 Current dose is 2.5 mg on Monday and Thursday and 5 mg (2 tabs) all other days. Dosing subject to change based on lab results. We Performed the Following AMB POC BASIC METABOLIC PANEL [13347 CPT(R)] AMB POC PT/INR [70011 CPT(R)] Follow-up Instructions Return in about 4 weeks (around 4/30/2018). Patient Instructions Atrial Fibrillation: Care Instructions Your Care Instructions Atrial fibrillation is an irregular and often fast heartbeat. Treating this condition is important for several reasons. It can cause blood clots, which can travel from your heart to your brain and cause a stroke. If you have a fast heartbeat, you may feel lightheaded, dizzy, and weak. An irregular heartbeat can also increase your risk for heart failure. Atrial fibrillation is often the result of another heart condition, such as high blood pressure or coronary artery disease. Making changes to improve your heart condition will help you stay healthy and active. Follow-up care is a key part of your treatment and safety. Be sure to make and go to all appointments, and call your doctor if you are having problems. It's also a good idea to know your test results and keep a list of the medicines you take. How can you care for yourself at home? Medicines ? · Take your medicines exactly as prescribed. Call your doctor if you think you are having a problem with your medicine. You will get more details on the specific medicines your doctor prescribes. ? · If your doctor has given you a blood thinner to prevent a stroke, be sure you get instructions about how to take your medicine safely. Blood thinners can cause serious bleeding problems. ? · Do not take any vitamins, over-the-counter drugs, or herbal products without talking to your doctor first. ? Lifestyle changes ? · Do not smoke.  Smoking can increase your chance of a stroke and heart List Items Addressed This Visit       Prediabetes    Relevant Orders    Hemoglobin A1C    Easy bruising - Primary    Relevant Orders    CBC and Auto Differential    UTI symptoms    Relevant Orders    Urine Culture (Completed)    POCT UA Automated manually resulted (Completed)     Other Visit Diagnoses       Encounter for screening mammogram for breast cancer        Need for hepatitis C screening test        Relevant Orders    Hepatitis C antibody        Easy bruising blood work to evaluate this is most likely age-related physical exam is essentially unremarkable  UTI symptoms send urine for culture  Slightly elevated blood sugar most likely due to the cholesterol issues will check hemoglobin A1c  Regarding the parathyroid issue follow-up with endocrinology  Bone density does show low bone mass do not want that to get worse with the parathyroid issue take calcium and vitamin D and regular physical exercise but proceed with caution due to the parathyroid I recommend a follow-up discussion with the endocrinologist  Otherwise follow-up as needed and can remember to schedule annual wellness exam  Also due for mammogram         Nicolette Alvarenga MD   attack. If you need help quitting, talk to your doctor about stop-smoking programs and medicines. These can increase your chances of quitting for good. ? · Eat a heart-healthy diet. ? · Stay at a healthy weight. Lose weight if you need to.  
? · Limit alcohol to 2 drinks a day for men and 1 drink a day for women. Too much alcohol can cause health problems. ? · Avoid colds and flu. Get a pneumococcal vaccine shot. If you have had one before, ask your doctor whether you need another dose. Get a flu shot every year. If you must be around people with colds or flu, wash your hands often. Activity ? · If your doctor recommends it, get more exercise. Walking is a good choice. Bit by bit, increase the amount you walk every day. Try for at least 30 minutes on most days of the week. You also may want to swim, bike, or do other activities. Your doctor may suggest that you join a cardiac rehabilitation program so that you can have help increasing your physical activity safely. ? · Start light exercise if your doctor says it is okay. Even a small amount will help you get stronger, have more energy, and manage stress. Walking is an easy way to get exercise. Start out by walking a little more than you did in the hospital. Gradually increase the amount you walk. ? · When you exercise, watch for signs that your heart is working too hard. You are pushing too hard if you cannot talk while you are exercising. If you become short of breath or dizzy or have chest pain, sit down and rest immediately. ? · Check your pulse regularly. Place two fingers on the artery at the palm side of your wrist, in line with your thumb. If your heartbeat seems uneven or fast, talk to your doctor. When should you call for help? Call 911 anytime you think you may need emergency care. For example, call if: 
? · You have symptoms of a heart attack. These may include: ¨ Chest pain or pressure, or a strange feeling in the chest. 
¨ Sweating. ¨ Shortness of breath. ¨ Nausea or vomiting. ¨ Pain, pressure, or a strange feeling in the back, neck, jaw, or upper belly or in one or both shoulders or arms. ¨ Lightheadedness or sudden weakness. ¨ A fast or irregular heartbeat. After you call 911, the  may tell you to chew 1 adult-strength or 2 to 4 low-dose aspirin. Wait for an ambulance. Do not try to drive yourself. ? · You have symptoms of a stroke. These may include: 
¨ Sudden numbness, tingling, weakness, or loss of movement in your face, arm, or leg, especially on only one side of your body. ¨ Sudden vision changes. ¨ Sudden trouble speaking. ¨ Sudden confusion or trouble understanding simple statements. ¨ Sudden problems with walking or balance. ¨ A sudden, severe headache that is different from past headaches. ? · You passed out (lost consciousness). ?Call your doctor now or seek immediate medical care if: 
? · You have new or increased shortness of breath. ? · You feel dizzy or lightheaded, or you feel like you may faint. ? · Your heart rate becomes irregular. ? · You can feel your heart flutter in your chest or skip heartbeats. Tell your doctor if these symptoms are new or worse. ? Watch closely for changes in your health, and be sure to contact your doctor if you have any problems. Where can you learn more? Go to http://marci-markus.info/. Enter U020 in the search box to learn more about \"Atrial Fibrillation: Care Instructions. \" Current as of: September 21, 2016 Content Version: 11.4 © 8358-3146 [employer]. Care instructions adapted under license by [employer] (which disclaims liability or warranty for this information). If you have questions about a medical condition or this instruction, always ask your healthcare professional. [address] any warranty or liability for your use of this information. Patient Instructions History Introducing [facility]! Fawn Tinoco introduces THE [employer]OCKLIST patient portal. Now you can access parts of your medical record, email your doctor's office, and request medication refills online. 1. In your internet browser, go to [email or web address] 2. Click on the First Time User? Click Here link in the Sign In box. You will see the New Member Sign Up page. 3. Enter your THE NOCKLIST Access Code exactly as it appears below. You will not need to use this code after youve completed the sign-up process. If you do not sign up before the expiration date, you must request a new code. · [employer]OCKLIST Access Code: [email or web address] Expires: 4/2/2018  3:49 PM 
 
4. Enter the last four digits of your Social Security Number (xxxx) and Date of Birth (mm/dd/yyyy) as indicated and click Submit. You will be taken to the next sign-up page. 5. Create a THE NOCKLIST ID. This will be your [employer]LIST login ID and cannot be changed, so think of one that is secure and easy to remember. 6. Create a [employer]LIST password. You can change your password at any time. 7. Enter your Password Reset Question and Answer. This can be used at a later time if you forget your password. 8. Enter your e-mail address. You will receive e-mail notification when new information is available in [address] 9. Click Sign Up. You can now view and download portions of your medical record. 10. Click the Download Summary menu link to download a portable copy of your medical information. If you have questions, please visit the Frequently Asked Questions section of the THE [employer]LIST website. Remember, THE NOCKLIST is NOT to be used for urgent needs. For medical emergencies, dial 911. Now available from your iPhone and Android! Please provide this summary of care documentation to your next provider. Your primary care clinician is listed as Anibal. If you have any questions after today's visit, please call [phone].

## 2024-03-07 LAB
BACTERIA UR CULT: NORMAL
NON-UH HIE BASO COUNT: 0.03 X1000 (ref 0–0.2)
NON-UH HIE BASOS %: 0.3 %
NON-UH HIE DIFF?: NO
NON-UH HIE EOS COUNT: 0.1 X1000 (ref 0–0.5)
NON-UH HIE EOSIN %: 1.2 %
NON-UH HIE HCT: 40.1 % (ref 36–46)
NON-UH HIE HGB A1C: 5.3 %
NON-UH HIE HGB: 13.6 G/DL (ref 12–16)
NON-UH HIE INSTR WBC: 8.4
NON-UH HIE LYMPH %: 22.7 %
NON-UH HIE LYMPH COUNT: 1.91 X1000 (ref 1.2–4.8)
NON-UH HIE MCH: 31.6 PG (ref 27–34)
NON-UH HIE MCHC: 33.8 G/DL (ref 32–37)
NON-UH HIE MCV: 93.5 FL (ref 80–100)
NON-UH HIE MONO %: 6.2 %
NON-UH HIE MONO COUNT: 0.52 X1000 (ref 0.1–1)
NON-UH HIE MPV: 8.1 FL (ref 7.4–10.4)
NON-UH HIE NEUTROPHIL %: 69.5 %
NON-UH HIE NEUTROPHIL COUNT (ANC): 5.84 X1000 (ref 1.4–8.8)
NON-UH HIE NUCLEATED RBC: 0 /100WBC
NON-UH HIE PLATELET: 285 X10 (ref 150–450)
NON-UH HIE RBC: 4.29 X10 (ref 4.2–5.4)
NON-UH HIE RDW: 12.8 % (ref 11.5–14.5)
NON-UH HIE WBC: 8.4 X10 (ref 4.5–11)

## 2024-03-11 DIAGNOSIS — E78.00 HYPERCHOLESTEROLEMIA: ICD-10-CM

## 2024-03-11 DIAGNOSIS — M54.50 LOW BACK PAIN, UNSPECIFIED BACK PAIN LATERALITY, UNSPECIFIED CHRONICITY, UNSPECIFIED WHETHER SCIATICA PRESENT: Primary | ICD-10-CM

## 2024-03-11 DIAGNOSIS — I10 HYPERTENSION, UNSPECIFIED TYPE: ICD-10-CM

## 2024-03-11 RX ORDER — ATORVASTATIN CALCIUM 10 MG/1
10 TABLET, FILM COATED ORAL DAILY
Qty: 90 TABLET | Refills: 3 | Status: SHIPPED | OUTPATIENT
Start: 2024-03-11

## 2024-03-11 RX ORDER — LOSARTAN POTASSIUM 25 MG/1
25 TABLET ORAL DAILY
Qty: 90 TABLET | Refills: 0 | Status: SHIPPED | OUTPATIENT
Start: 2024-03-11 | End: 2024-06-10

## 2024-03-11 NOTE — TELEPHONE ENCOUNTER
Patient asked to have her losartan 25mg and her atorvastatin 10mg was sent instead. She is wondering if she can have the losartan sent to [facility].

## 2024-03-24 PROBLEM — R23.3 EASY BRUISING: Status: ACTIVE | Noted: 2024-03-24

## 2024-03-24 PROBLEM — R39.9 UTI SYMPTOMS: Status: ACTIVE | Noted: 2024-03-24

## 2024-03-28 DIAGNOSIS — R31.21 ASYMPTOMATIC MICROSCOPIC HEMATURIA: ICD-10-CM

## 2024-03-29 RX ORDER — VALACYCLOVIR HYDROCHLORIDE 1 G/1
1000 TABLET, FILM COATED ORAL EVERY 12 HOURS PRN
Qty: 14 TABLET | Refills: 0 | Status: SHIPPED | OUTPATIENT
Start: 2024-03-29

## 2024-06-09 DIAGNOSIS — I10 HYPERTENSION, UNSPECIFIED TYPE: ICD-10-CM

## 2024-06-10 RX ORDER — LOSARTAN POTASSIUM 25 MG/1
25 TABLET ORAL DAILY
Qty: 90 TABLET | Refills: 0 | Status: SHIPPED | OUTPATIENT
Start: 2024-06-10

## 2024-06-28 DIAGNOSIS — R31.21 ASYMPTOMATIC MICROSCOPIC HEMATURIA: ICD-10-CM

## 2024-06-28 RX ORDER — VALACYCLOVIR HYDROCHLORIDE 1 G/1
1000 TABLET, FILM COATED ORAL EVERY 12 HOURS PRN
Qty: 14 TABLET | Refills: 0 | Status: SHIPPED | OUTPATIENT
Start: 2024-06-28

## 2024-07-12 ENCOUNTER — TELEPHONE (OUTPATIENT)
Dept: PRIMARY CARE | Facility: CLINIC | Age: 69
End: 2024-07-12
Payer: MEDICARE

## 2024-07-12 DIAGNOSIS — M54.50 LOW BACK PAIN, UNSPECIFIED BACK PAIN LATERALITY, UNSPECIFIED CHRONICITY, UNSPECIFIED WHETHER SCIATICA PRESENT: Primary | ICD-10-CM

## 2024-08-14 ENCOUNTER — APPOINTMENT (OUTPATIENT)
Dept: PRIMARY CARE | Facility: CLINIC | Age: 69
End: 2024-08-14
Payer: MEDICARE

## 2024-08-19 ENCOUNTER — APPOINTMENT (OUTPATIENT)
Dept: PRIMARY CARE | Facility: CLINIC | Age: 69
End: 2024-08-19
Payer: MEDICARE

## 2024-08-19 DIAGNOSIS — R20.2 LEG PARESTHESIA: ICD-10-CM

## 2024-08-19 DIAGNOSIS — E83.52 HYPERCALCEMIA: Primary | ICD-10-CM

## 2024-08-19 PROBLEM — M15.9 PRIMARY OSTEOARTHRITIS INVOLVING MULTIPLE JOINTS: Status: ACTIVE | Noted: 2024-08-19

## 2024-08-19 PROBLEM — R79.89 ELEVATED TSH: Status: RESOLVED | Noted: 2023-03-06 | Resolved: 2024-08-19

## 2024-08-19 PROBLEM — E78.2 MIXED HYPERLIPIDEMIA: Status: ACTIVE | Noted: 2023-03-06

## 2024-08-19 PROBLEM — M15.0 PRIMARY OSTEOARTHRITIS INVOLVING MULTIPLE JOINTS: Status: ACTIVE | Noted: 2024-08-19

## 2024-08-19 PROBLEM — E78.00 HYPERCHOLESTEROLEMIA: Status: RESOLVED | Noted: 2023-01-23 | Resolved: 2024-08-19

## 2024-08-19 PROCEDURE — 1036F TOBACCO NON-USER: CPT | Performed by: FAMILY MEDICINE

## 2024-08-19 PROCEDURE — 1158F ADVNC CARE PLAN TLK DOCD: CPT | Performed by: FAMILY MEDICINE

## 2024-08-19 PROCEDURE — 1159F MED LIST DOCD IN RCRD: CPT | Performed by: FAMILY MEDICINE

## 2024-08-19 PROCEDURE — 1123F ACP DISCUSS/DSCN MKR DOCD: CPT | Performed by: FAMILY MEDICINE

## 2024-08-19 PROCEDURE — 99213 OFFICE O/P EST LOW 20 MIN: CPT | Performed by: FAMILY MEDICINE

## 2024-08-19 PROCEDURE — 1160F RVW MEDS BY RX/DR IN RCRD: CPT | Performed by: FAMILY MEDICINE

## 2024-08-19 RX ORDER — ASPIRIN 81 MG/1
81 TABLET ORAL
COMMUNITY
Start: 2023-03-05

## 2024-08-19 RX ORDER — NITROGLYCERIN 0.4 MG/1
0.4 TABLET SUBLINGUAL EVERY 5 MIN PRN
COMMUNITY
Start: 2024-05-13

## 2024-08-19 NOTE — ASSESSMENT & PLAN NOTE
Persistent right paresthesia no weakness  Complete physical therapy if no improvement on therapy follow-up in office to check vitamin B12 and reassess to see if imaging necessary

## 2024-08-19 NOTE — ASSESSMENT & PLAN NOTE
Persistent hypercalcemia concern that it may be from the hydrochlorothiazide  Discontinue hydrochlorothiazide monitor blood pressure recheck in office in September

## 2024-08-19 NOTE — PROGRESS NOTES
Subjective   Patient ID: Qian Sanz is a 69 y.o. female who presents for No chief complaint on file..  A 2 way audio and visual telecommunication device used for virtual visit at patient's request and with her permission  Very pleasant 69-year-old with history of angina hypertension hyperlipidemia and osteoarthritis seen virtually today at her request because she is concerned about her hydrochlorothiazide she has been on it for over 20 years for blood pressure control and her calcium has been persistently high her cardiologist whom she sees regularly recommended taking a half of the tablet of the 25 mg every other day to see if that improves the calcium her blood pressure is running 120/50 and she feels well does not feel lightheaded or dizzy is very concerned about the elevated calcium extensive discussion regarding this issue  Also has been undergoing physical therapy for pain deep in the right Bl[place] has history of sciatica on and off she has a persistent tingling in her right leg down to the foot and arch her legs not weak she has no trouble walking but she describes it as persistently feeling like the baby is buzzing inside her shoe against the bottom of her foot no weakness in the arms or legs no difficulty walking no visual issues        Review of Systems  Constitutional: no chills, no fever and no night sweats.   Eyes: no blurred vision and no eyesight problems.   ENT: no hearing loss, no nasal congestion, no nasal discharge, no hoarseness and no sore throat.   Cardiovascular: no chest pain, no intermittent leg claudication, no lower extremity edema, no palpitations and no syncope.   Respiratory: no cough, no shortness of breath during exertion, no shortness of breath at rest and no wheezing.   Gastrointestinal: no abdominal pain, no blood in stools, no constipation, no diarrhea, no melena, no nausea, no rectal pain and no vomiting.   Genitourinary: no dysuria, no change in urinary frequency, no  urinary hesitancy, no feelings of urinary urgency and no vaginal discharge.   Musculoskeletal: no arthralgias,  no back pain and no myalgias.   Integumentary: no new skin lesions and no rashes.   Neurological: no difficulty walking, no headache, no limb weakness, no numbness and no tingling.   Psychiatric: no anxiety, no depression, no anhedonia and no substance use disorders.   Endocrine: no recent weight gain and no recent weight loss.   Hematologic/Lymphatic: no tendency for easy bruising and no swollen glands .    Objective    There were no vitals taken for this visit.   Physical Exam  The patient appears well and is in absolutely no distress  No conversational dyspnea  No difficulty moving about the room  No accessory muscle use or retractions  Assessment/Plan   Problem List Items Addressed This Visit       Leg paresthesia     Persistent right paresthesia no weakness  Complete physical therapy if no improvement on therapy follow-up in office to check vitamin B12 and reassess to see if imaging necessary         Hypercalcemia - Primary     Persistent hypercalcemia concern that it may be from the hydrochlorothiazide  Discontinue hydrochlorothiazide monitor blood pressure recheck in office in September        Provider impression  12.5 mg of hydrochlorothiazide every day is most likely not contributing much to blood pressure control I am recommending discontinuing it and we will recheck the blood pressure at her regular visit in September  Will recheck the calcium as well I suspect another cause of the elevated calcium may be supplements  Regarding the leg paresthesia this is most likely sciatica if it does not improve with physical therapy will also check vitamin B12 to rule out other causes and also consider imaging of the lower back         Nicolette Alvarenga MD

## 2024-09-03 ENCOUNTER — APPOINTMENT (OUTPATIENT)
Dept: PRIMARY CARE | Facility: CLINIC | Age: 69
End: 2024-09-03
Payer: MEDICARE

## 2024-09-03 VITALS
WEIGHT: 130 LBS | DIASTOLIC BLOOD PRESSURE: 70 MMHG | OXYGEN SATURATION: 98 % | BODY MASS INDEX: 24.56 KG/M2 | SYSTOLIC BLOOD PRESSURE: 128 MMHG | HEART RATE: 61 BPM | TEMPERATURE: 97.3 F

## 2024-09-03 DIAGNOSIS — R20.0 NUMBNESS AND TINGLING OF RIGHT LEG: ICD-10-CM

## 2024-09-03 DIAGNOSIS — I10 PRIMARY HYPERTENSION: ICD-10-CM

## 2024-09-03 DIAGNOSIS — Z00.00 MEDICARE ANNUAL WELLNESS VISIT, SUBSEQUENT: Primary | ICD-10-CM

## 2024-09-03 DIAGNOSIS — E78.2 MIXED HYPERLIPIDEMIA: ICD-10-CM

## 2024-09-03 DIAGNOSIS — R20.2 NUMBNESS AND TINGLING OF RIGHT LEG: ICD-10-CM

## 2024-09-03 DIAGNOSIS — Z11.59 NEED FOR HEPATITIS C SCREENING TEST: ICD-10-CM

## 2024-09-03 RX ORDER — ATORVASTATIN CALCIUM 10 MG/1
10 TABLET, FILM COATED ORAL DAILY
Qty: 90 TABLET | Refills: 3 | Status: SHIPPED | OUTPATIENT
Start: 2024-09-03

## 2024-09-03 ASSESSMENT — PATIENT HEALTH QUESTIONNAIRE - PHQ9
SUM OF ALL RESPONSES TO PHQ9 QUESTIONS 1 AND 2: 0
2. FEELING DOWN, DEPRESSED OR HOPELESS: NOT AT ALL
1. LITTLE INTEREST OR PLEASURE IN DOING THINGS: NOT AT ALL

## 2024-09-03 ASSESSMENT — ENCOUNTER SYMPTOMS: HYPERTENSION: 1

## 2024-09-03 NOTE — PROGRESS NOTES
Subjective   Reason for Visit: Qian Sanz is an 69 y.o. female here for a Medicare Wellness visit.     Past Medical, Surgical, and Family History reviewed and updated in chart.    Reviewed all medications by prescribing practitioner or clinical pharmacist (such as prescriptions, OTCs, herbal therapies and supplements) and documented in the medical record.    Parathyroid  HTN and lipid meds  Right hip pain  Very pleasant 69-year-old here for annual Medicare wellness exam  On statin for hyperlipidemia and losartan and Microzide for hypertension which she is tolerating well is here for follow-up of those conditions and her annual Medicare wellness exam she has been having problems with hypercalciuria and is being evaluated and will be needing her parathyroid removed her last TSH was normal she takes levothyroxine daily 37.5 1-1/2 daily and feels well with no thyroid symptoms since August 27 she has been having some lightheadedness not dizzy no CVA symptoms not listing 1 direction or the other no slurred speech she is some slight swelling in her ankles which is some tingling from her right leg to foot from the buttock to the foot last 3 weeks no injury to her back does have history of arthritis seems to be aggravated by sitting but is relatively chronic    Hypertension  This is a chronic problem. The current episode started more than 1 year ago. The problem has been gradually improving since onset. The problem is controlled. Pertinent negatives include no anxiety, blurred vision, chest pain, headaches, malaise/fatigue, neck pain, orthopnea, palpitations, peripheral edema, PND, shortness of breath or sweats. Agents associated with hypertension include thyroid hormones. Risk factors for coronary artery disease include family history, dyslipidemia and post-menopausal state. Past treatments include angiotensin blockers and lifestyle changes. The current treatment provides significant improvement. There are no  compliance problems.  There is no history of angina, kidney disease, CAD/MI, CVA, heart failure, left ventricular hypertrophy, PVD or retinopathy. Identifiable causes of hypertension include hyperparathyroidism and a thyroid problem. There is no history of chronic renal disease, coarctation of the aorta, hyperaldosteronism, hypercortisolism, a hypertension causing med, pheochromocytoma, renovascular disease or sleep apnea.   Hyperlipidemia  This is a chronic problem. The current episode started more than 1 year ago. The problem is resistant. Recent lipid tests were reviewed and are variable. Exacerbating diseases include hypothyroidism. She has no history of chronic renal disease, diabetes, liver disease, obesity or nephrotic syndrome. There are no known factors aggravating her hyperlipidemia. Pertinent negatives include no chest pain, focal sensory loss, focal weakness, leg pain, myalgias or shortness of breath. Current antihyperlipidemic treatment includes diet change, exercise and statins. The current treatment provides moderate improvement of lipids. There are no compliance problems.  Risk factors for coronary artery disease include dyslipidemia, family history, hypertension and post-menopausal.   Thyroid Problem  Presents for follow-up visit. Patient reports no anxiety, cold intolerance, constipation, depressed mood, diaphoresis, diarrhea, dry skin, fatigue, hair loss, heat intolerance, hoarse voice, leg swelling, menstrual problem, nail problem, palpitations, tremors, visual change, weight gain or weight loss. The symptoms have been stable. Her past medical history is significant for hyperlipidemia. There is no history of diabetes or heart failure.       Patient Self Assessment of Health Status  Patient Self Assessment: Good    Nutrition and Exercise  Current Diet: Well Balanced Diet  Adequate Fluid Intake: Yes  Caffeine: Yes  Exercise Frequency: Regularly    Functional Ability/Level of Safety  Cognitive  Impairment Observed: No cognitive impairment observed  Cognitive Impairment Reported: No cognitive impairment reported by patient or family    Home Safety Risk Factors: None         Patient Care Team:  Nicolette Alvarenga MD as PCP - General  Nicolette Alvarenga MD as PCP - [facility] Attributed Provider     Review of Systems   Constitutional:  Negative for diaphoresis, fatigue, malaise/fatigue, weight gain and weight loss.   HENT:  Negative for hoarse voice.    Eyes:  Negative for blurred vision.   Respiratory:  Negative for shortness of breath.    Cardiovascular:  Negative for chest pain, palpitations, orthopnea and PND.   Gastrointestinal:  Negative for constipation and diarrhea.   Endocrine: Negative for cold intolerance and heat intolerance.   Genitourinary:  Negative for menstrual problem.   Musculoskeletal:  Negative for myalgias and neck pain.   Neurological:  Negative for tremors, focal weakness and headaches.   Psychiatric/Behavioral:  The patient is not nervous/anxious.      Constitutional: no chills, no fever and no night sweats.   Eyes: no blurred vision and no eyesight problems.   ENT: no hearing loss, no nasal congestion, no nasal discharge, no hoarseness and no sore throat.   Cardiovascular: no chest pain, no intermittent leg claudication, no lower extremity edema, no palpitations and no syncope.   Respiratory: no cough, no shortness of breath during exertion, no shortness of breath at rest and no wheezing.   Gastrointestinal: no abdominal pain, no blood in stools, no constipation, no diarrhea, no melena, no nausea, no rectal pain and no vomiting.   Genitourinary: no dysuria, no change in urinary frequency, no urinary hesitancy, no feelings of urinary urgency and no vaginal discharge.   Musculoskeletal: no arthralgias,  no back pain and no myalgias.   Integumentary: no new skin lesions and no rashes.   Neurological: no difficulty walking, no headache, no limb weakness, no numbness and no tingling.    Psychiatric: no anxiety, no depression, no anhedonia and no substance use disorders.   Endocrine: no recent weight gain and no recent weight loss.   Hematologic/Lymphatic: no tendency for easy bruising and no swollen glands .    Medicare Wellness Billing Compliance Satisfied    *This is a visual tool to show completion of required items on the day of the visit. Green checks will only appear on the date of visit.      Objective   Vitals:  /70   Pulse 61   Temp 36.3 °C (97.3 °F)   Wt 59 kg (130 lb)   SpO2 98%   BMI 24.56 kg/m²       Physical Exam  The patient appeared well nourished and normally developed. Vital signs as documented. Head exam is unremarkable. No scleral icterus or corneal arcus noted.  Pupils are equal round reactive to light extraocular movements are intact no hemorrhages noted on funduscopic exam mouth mucous membranes are moist no exudates ears canals clear TMs are gray pearly not injected nose no rhinorrhea or epistaxis Neck is without jugular venous distension, thyromegaly, or carotid bruits. Carotid upstrokes are brisk bilaterally. Lungs are clear to auscultation and percussion. Cardiac exam reveals the PMI to be normally sized and situated. Rhythm is regular. First and second heart sounds normal. No murmurs, rubs or gallops. Abdominal exam reveals normal bowel sounds, no masses, no organomegaly and no aortic enlargement. Extremities are nonedematous and both femoral and pedal pulses are normal.  Neurologic exam DTRs are equal bilaterally no focal deficits strength is symmetrical heme lymph no palpable lymph nodes in the neck axilla or groin    Assessment/Plan   Problem List Items Addressed This Visit       Mixed hyperlipidemia    Current Assessment & Plan     Stable and controlled on atorvastatin  Continue current plan of care         Relevant Medications    atorvastatin (Lipitor) 10 mg tablet    Other Relevant Orders    Lipid panel    C-reactive protein    Primary hypertension     Current Assessment & Plan     Stable and controlled on losartan and hydrochlorothiazide  Continue current plan of care         Relevant Orders    Comprehensive metabolic panel    Medicare annual wellness visit, subsequent - Primary    Current Assessment & Plan     Healthy 69-year-old with multiple medical issues  Recommend RSV vaccine  Recommend flu shot  Schedule mammogram  Continue annual exams         Numbness and tingling of right leg    Current Assessment & Plan     Differential diagnosis includes sciatica arthritis B12 deficiency  Begin physical therapy  X-ray of the hip and lower back  Further plan pending results         Relevant Orders    XR hip right with pelvis when performed 2 or 3 views     Other Visit Diagnoses       Need for hepatitis C screening test        Relevant Orders    Hepatitis C antibody

## 2024-09-04 LAB
NON-UH HIE A/G RATIO: 1.4
NON-UH HIE ALB: 4.2 G/DL (ref 3.4–5)
NON-UH HIE ALK PHOS: 78 UNIT/L (ref 45–117)
NON-UH HIE BILIRUBIN, TOTAL: 0.9 MG/DL (ref 0.3–1.2)
NON-UH HIE BUN/CREAT RATIO: 16.7
NON-UH HIE BUN: 15 MG/DL (ref 9–23)
NON-UH HIE C-REACTIVE PROTEIN, QUANTITATIVE: <0.4 MG/DL (ref 0–0.9)
NON-UH HIE CALCIUM: 10.6 MG/DL (ref 8.7–10.4)
NON-UH HIE CALCULATED LDL CHOLESTEROL: 111 MG/DL (ref 60–130)
NON-UH HIE CALCULATED OSMOLALITY: 280 MOSM/KG (ref 275–295)
NON-UH HIE CHLORIDE: 107 MMOL/L (ref 98–107)
NON-UH HIE CHOLESTEROL: 201 MG/DL (ref 100–200)
NON-UH HIE CO2, VENOUS: 30 MMOL/L (ref 20–31)
NON-UH HIE CREATININE: 0.9 MG/DL (ref 0.5–0.8)
NON-UH HIE GFR AA: >60
NON-UH HIE GLOBULIN: 3.1 G/DL
NON-UH HIE GLOMERULAR FILTRATION RATE: >60 ML/MIN/1.73M?
NON-UH HIE GLUCOSE: 86 MG/DL (ref 74–106)
NON-UH HIE GOT: 21 UNIT/L (ref 15–37)
NON-UH HIE GPT: 13 UNIT/L (ref 10–49)
NON-UH HIE HDL CHOLESTEROL: 74 MG/DL (ref 40–60)
NON-UH HIE HEPATITIS C ANTIBODY: NONREACTIVE
NON-UH HIE K: 4.3 MMOL/L (ref 3.5–5.1)
NON-UH HIE NA: 140 MMOL/L (ref 135–145)
NON-UH HIE TOTAL CHOL/HDL CHOL RATIO: 2.7
NON-UH HIE TOTAL PROTEIN: 7.3 G/DL (ref 5.7–8.2)
NON-UH HIE TRIGLYCERIDES: 80 MG/DL (ref 30–150)

## 2024-09-09 DIAGNOSIS — I10 HYPERTENSION, UNSPECIFIED TYPE: ICD-10-CM

## 2024-09-09 RX ORDER — LOSARTAN POTASSIUM 25 MG/1
25 TABLET ORAL DAILY
Qty: 90 TABLET | Refills: 0 | Status: SHIPPED | OUTPATIENT
Start: 2024-09-09

## 2024-09-14 PROBLEM — T14.8XXA PUNCTURE WOUND: Status: RESOLVED | Noted: 2023-09-28 | Resolved: 2024-09-14

## 2024-09-14 PROBLEM — R39.9 UTI SYMPTOMS: Status: RESOLVED | Noted: 2024-03-24 | Resolved: 2024-09-14

## 2024-09-14 PROBLEM — M25.569 KNEE PAIN: Status: RESOLVED | Noted: 2024-03-06 | Resolved: 2024-09-14

## 2024-09-14 PROBLEM — M25.559 ARTHRALGIA OF HIP: Status: RESOLVED | Noted: 2024-03-06 | Resolved: 2024-09-14

## 2024-09-14 PROBLEM — R20.0 NUMBNESS AND TINGLING OF RIGHT LEG: Status: ACTIVE | Noted: 2024-03-06

## 2024-09-14 ASSESSMENT — PATIENT HEALTH QUESTIONNAIRE - PHQ9
1. LITTLE INTEREST OR PLEASURE IN DOING THINGS: NOT AT ALL
SUM OF ALL RESPONSES TO PHQ9 QUESTIONS 1 AND 2: 0
SUM OF ALL RESPONSES TO PHQ9 QUESTIONS 1 AND 2: 0
1. LITTLE INTEREST OR PLEASURE IN DOING THINGS: NOT AT ALL
2. FEELING DOWN, DEPRESSED OR HOPELESS: NOT AT ALL
2. FEELING DOWN, DEPRESSED OR HOPELESS: NOT AT ALL

## 2024-09-14 ASSESSMENT — ENCOUNTER SYMPTOMS
SWEATS: 0
ORTHOPNEA: 0
MYALGIAS: 0
PALPITATIONS: 0
DRY SKIN: 0
WEIGHT GAIN: 0
HAIR LOSS: 0
CONSTIPATION: 0
LEG PAIN: 0
TREMORS: 0
WEIGHT LOSS: 0
NERVOUS/ANXIOUS: 0
DIARRHEA: 0
DIAPHORESIS: 0
BLURRED VISION: 0
DEPRESSED MOOD: 0
FATIGUE: 0
HEADACHES: 0
HOARSE VOICE: 0
FOCAL SENSORY LOSS: 0
FOCAL WEAKNESS: 0
PND: 0
SHORTNESS OF BREATH: 0
NECK PAIN: 0
VISUAL CHANGE: 0

## 2024-09-14 ASSESSMENT — ACTIVITIES OF DAILY LIVING (ADL)
GROCERY_SHOPPING: INDEPENDENT
MANAGING_FINANCES: INDEPENDENT
DRESSING: INDEPENDENT
TAKING_MEDICATION: INDEPENDENT
DOING_HOUSEWORK: INDEPENDENT
BATHING: INDEPENDENT

## 2024-09-14 NOTE — ASSESSMENT & PLAN NOTE
Differential diagnosis includes sciatica arthritis B12 deficiency  Begin physical therapy  X-ray of the hip and lower back  Further plan pending results

## 2024-09-14 NOTE — ASSESSMENT & PLAN NOTE
Healthy 69-year-old with multiple medical issues  Recommend RSV vaccine  Recommend flu shot  Schedule mammogram  Continue annual exams

## 2024-09-17 ENCOUNTER — OFFICE VISIT (OUTPATIENT)
Dept: PRIMARY CARE | Facility: CLINIC | Age: 69
End: 2024-09-17
Payer: MEDICARE

## 2024-09-17 ENCOUNTER — APPOINTMENT (OUTPATIENT)
Dept: LAB | Facility: LAB | Age: 69
End: 2024-09-17
Payer: MEDICARE

## 2024-09-17 VITALS
HEIGHT: 61 IN | TEMPERATURE: 97.3 F | SYSTOLIC BLOOD PRESSURE: 130 MMHG | HEART RATE: 65 BPM | BODY MASS INDEX: 23.98 KG/M2 | OXYGEN SATURATION: 96 % | DIASTOLIC BLOOD PRESSURE: 70 MMHG | WEIGHT: 127 LBS

## 2024-09-17 DIAGNOSIS — R19.7 DIARRHEA OF PRESUMED INFECTIOUS ORIGIN: Primary | ICD-10-CM

## 2024-09-17 PROCEDURE — 99214 OFFICE O/P EST MOD 30 MIN: CPT | Performed by: FAMILY MEDICINE

## 2024-09-17 PROCEDURE — 3078F DIAST BP <80 MM HG: CPT | Performed by: FAMILY MEDICINE

## 2024-09-17 PROCEDURE — 3075F SYST BP GE 130 - 139MM HG: CPT | Performed by: FAMILY MEDICINE

## 2024-09-17 PROCEDURE — 1159F MED LIST DOCD IN RCRD: CPT | Performed by: FAMILY MEDICINE

## 2024-09-17 PROCEDURE — 3008F BODY MASS INDEX DOCD: CPT | Performed by: FAMILY MEDICINE

## 2024-09-17 PROCEDURE — 1123F ACP DISCUSS/DSCN MKR DOCD: CPT | Performed by: FAMILY MEDICINE

## 2024-09-17 ASSESSMENT — PATIENT HEALTH QUESTIONNAIRE - PHQ9
2. FEELING DOWN, DEPRESSED OR HOPELESS: NOT AT ALL
SUM OF ALL RESPONSES TO PHQ9 QUESTIONS 1 AND 2: 0
1. LITTLE INTEREST OR PLEASURE IN DOING THINGS: NOT AT ALL

## 2024-09-17 NOTE — PROGRESS NOTES
"Subjective   Patient ID: Qian Sanz is a 69 y.o. female who presents for Follow-up (Loose stool x 3 days, ).  HPI    Review of Systems  Constitutional: no chills, no fever and no night sweats.   Eyes: no blurred vision and no eyesight problems.   ENT: no hearing loss, no nasal congestion, no nasal discharge, no hoarseness and no sore throat.   Cardiovascular: no chest pain, no intermittent leg claudication, no lower extremity edema, no palpitations and no syncope.   Respiratory: no cough, no shortness of breath during exertion, no shortness of breath at rest and no wheezing.   Gastrointestinal: no abdominal pain, no blood in stools, no constipation, no diarrhea, no melena, no nausea, no rectal pain and no vomiting.   Genitourinary: no dysuria, no change in urinary frequency, no urinary hesitancy, no feelings of urinary urgency and no vaginal discharge.   Musculoskeletal: no arthralgias,  no back pain and no myalgias.   Integumentary: no new skin lesions and no rashes.   Neurological: no difficulty walking, no headache, no limb weakness, no numbness and no tingling.   Psychiatric: no anxiety, no depression, no anhedonia and no substance use disorders.   Endocrine: no recent weight gain and no recent weight loss.   Hematologic/Lymphatic: no tendency for easy bruising and no swollen glands .    Objective    /70   Pulse 65   Temp 36.3 °C (97.3 °F)   Ht 1.549 m (5' 1\")   Wt 57.6 kg (127 lb)   SpO2 96%   BMI 24.00 kg/m²    Physical Exam  The patient appeared well nourished and normally developed. Vital signs as documented. Head exam is unremarkable. No scleral icterus or corneal arcus noted.  Pupils are equal round reactive to light extraocular movements are intact no hemorrhages noted on funduscopic exam mouth mucous membranes are moist no exudates ears canals clear TMs are gray pearly not injected nose no rhinorrhea or epistaxis Neck is without jugular venous distension, thyromegaly, or carotid " bruits. Carotid upstrokes are brisk bilaterally. Lungs are clear to auscultation and percussion. Cardiac exam reveals the PMI to be normally sized and situated. Rhythm is regular. First and second heart sounds normal. No murmurs, rubs or gallops. Abdominal exam reveals normal bowel sounds, no masses, no organomegaly and no aortic enlargement. Extremities are nonedematous and both femoral and pedal pulses are normal.  Neurologic exam DTRs are equal bilaterally no focal deficits strength is symmetrical heme lymph no palpable lymph nodes in the neck axilla or groin    Assessment/Plan   Problem List Items Addressed This Visit       Diarrhea of presumed infectious origin - Primary    Relevant Orders    Stool Pathogen Panel, PCR    Ova/Para + Giardia/Cryptosporidium Antigen    C. difficile, PCR            Nicolette Alvarenga MD

## 2024-09-18 ENCOUNTER — LAB REQUISITION (OUTPATIENT)
Dept: LAB | Facility: HOSPITAL | Age: 69
End: 2024-09-18
Payer: MEDICARE

## 2024-09-18 DIAGNOSIS — R19.7 DIARRHEA, UNSPECIFIED: ICD-10-CM

## 2024-09-18 LAB
NON-UH HIE C. DIFFICILE TOXIN: NEGATIVE
NON-UH HIE CRYPTOSPORIDIUM ANTIGEN: NEGATIVE
NON-UH HIE GIARDIA ANTIGEN: NEGATIVE
NON-UH HIE OP INT QC: PRESENT

## 2024-09-18 PROCEDURE — 87506 IADNA-DNA/RNA PROBE TQ 6-11: CPT

## 2024-09-19 LAB
C COLI+JEJ+UPSA DNA STL QL NAA+PROBE: DETECTED
EC STX1 GENE STL QL NAA+PROBE: NOT DETECTED
EC STX2 GENE STL QL NAA+PROBE: NOT DETECTED
NON-UH HIE MISC SENDOUT: NORMAL
NOROVIRUS GI + GII RNA STL NAA+PROBE: NOT DETECTED
RV RNA STL NAA+PROBE: NOT DETECTED
SALMONELLA DNA STL QL NAA+PROBE: NOT DETECTED
SHIGELLA DNA SPEC QL NAA+PROBE: NOT DETECTED
V CHOLERAE DNA STL QL NAA+PROBE: NOT DETECTED
Y ENTEROCOL DNA STL QL NAA+PROBE: NOT DETECTED

## 2024-09-20 DIAGNOSIS — A04.5 CAMPYLOBACTER DIARRHEA: Primary | ICD-10-CM

## 2024-09-20 RX ORDER — CIPROFLOXACIN 500 MG/1
500 TABLET ORAL 2 TIMES DAILY
Qty: 20 TABLET | Refills: 0 | Status: SHIPPED | OUTPATIENT
Start: 2024-09-20 | End: 2024-09-30

## 2024-09-25 PROBLEM — R19.7 DIARRHEA OF PRESUMED INFECTIOUS ORIGIN: Status: ACTIVE | Noted: 2024-09-25

## 2024-11-11 DIAGNOSIS — R31.21 ASYMPTOMATIC MICROSCOPIC HEMATURIA: ICD-10-CM

## 2024-11-13 ENCOUNTER — APPOINTMENT (OUTPATIENT)
Dept: SURGERY | Facility: CLINIC | Age: 69
End: 2024-11-13
Payer: MEDICARE

## 2024-11-13 VITALS
HEART RATE: 67 BPM | SYSTOLIC BLOOD PRESSURE: 152 MMHG | DIASTOLIC BLOOD PRESSURE: 78 MMHG | RESPIRATION RATE: 20 BRPM | WEIGHT: 127 LBS | BODY MASS INDEX: 24 KG/M2

## 2024-11-13 DIAGNOSIS — E21.0 PRIMARY HYPERPARATHYROIDISM (MULTI): Primary | ICD-10-CM

## 2024-11-13 DIAGNOSIS — E89.2 S/P PARATHYROIDECTOMY (CMS/HCC): ICD-10-CM

## 2024-11-13 PROCEDURE — 99205 OFFICE O/P NEW HI 60 MIN: CPT | Performed by: SURGERY

## 2024-11-13 PROCEDURE — 1036F TOBACCO NON-USER: CPT | Performed by: SURGERY

## 2024-11-13 PROCEDURE — 1123F ACP DISCUSS/DSCN MKR DOCD: CPT | Performed by: SURGERY

## 2024-11-13 PROCEDURE — 1126F AMNT PAIN NOTED NONE PRSNT: CPT | Performed by: SURGERY

## 2024-11-13 PROCEDURE — 1159F MED LIST DOCD IN RCRD: CPT | Performed by: SURGERY

## 2024-11-13 PROCEDURE — 3077F SYST BP >= 140 MM HG: CPT | Performed by: SURGERY

## 2024-11-13 PROCEDURE — 1160F RVW MEDS BY RX/DR IN RCRD: CPT | Performed by: SURGERY

## 2024-11-13 PROCEDURE — 76536 US EXAM OF HEAD AND NECK: CPT | Performed by: SURGERY

## 2024-11-13 PROCEDURE — 3078F DIAST BP <80 MM HG: CPT | Performed by: SURGERY

## 2024-11-13 RX ORDER — VALACYCLOVIR HYDROCHLORIDE 1 G/1
1000 TABLET, FILM COATED ORAL EVERY 12 HOURS PRN
Qty: 14 TABLET | Refills: 0 | Status: SHIPPED | OUTPATIENT
Start: 2024-11-13

## 2024-11-13 ASSESSMENT — ENCOUNTER SYMPTOMS
CONSTITUTIONAL NEGATIVE: 1
PSYCHIATRIC NEGATIVE: 1
MUSCULOSKELETAL NEGATIVE: 1
EYES NEGATIVE: 1
RESPIRATORY NEGATIVE: 1
NEUROLOGICAL NEGATIVE: 1
CARDIOVASCULAR NEGATIVE: 1
ENDOCRINE NEGATIVE: 1

## 2024-11-13 ASSESSMENT — PAIN SCALES - GENERAL: PAINLEVEL_OUTOF10: 0-NO PAIN

## 2024-11-13 NOTE — PROGRESS NOTES
Subjective   Patient ID: Qian Sanz is a 69 y.o. female who presents for surgical consultation for primary hyperparathyroidism.    HPI I saw Mrs. Sanz in surgery clinic today.  She was referred for surgical consultation for primary hyperparathyroidism.  She had undergone a metabolic workup by medical endocrinology at the [facility].  She has had elevated calcium on 2 occasions ranging from 10.7-10.9 with an associated elevated parathyroid hormone level 74-77 with the upper end of normal for that laboratory being 65.  This is consistent with primary hyperparathyroidism.  Her vitamin D levels ranged from 33-47.    In looking through her history, all of these laboratory tests have been done while she was on hydrochlorothiazide.  In addition, before she saw medical endocrinology she was also taking a calcium supplement.  Therefore I do not see laboratory values that would reflect her calcium level after being off of calcium supplementation and off hydrochlorothiazide.  Both of these could be worsening her hypercalcemia.    They did also send a 24-hour urine calcium level that was normal at 129.  DEXA bone density testing did show some osteoporosis of the femur with a T-score of -2.5.    In terms of symptoms associated with primary hyperparathyroidism, she denies any personal history of kidney stones.  No urinary frequency.  As mentioned normal 24-hour urine calcium level.  Bone-no fractures.  GI-no pancreatitis no peptic ulcer disease no constipation.  Neurocognitive-energy levels are good no depression.    She denies any neck pain.  No real difficulties with breathing or swallowing no troubles with her voice.  No personal history of head neck or chest radiation exposure.    Family history-no hyperparathyroidism.  Her sister does have a history of osteoporosis and is being treated with Fosamax.    Review of Systems   Constitutional: Negative.    HENT: Negative.     Eyes: Negative.    Respiratory:  Negative.     Cardiovascular: Negative.    Endocrine: Negative.    Musculoskeletal: Negative.    Neurological: Negative.    Psychiatric/Behavioral: Negative.         Objective   Physical Exam  Vitals reviewed.   Constitutional:       Appearance: Normal appearance.   Eyes:      Comments: No proptosis   Neck:      Vascular: No carotid bruit.      Comments: Her thyroid feels small.  No palpable nodules.  Trachea midline.  No other palpable neck masses.  Cardiovascular:      Rate and Rhythm: Normal rate and regular rhythm.      Heart sounds: Normal heart sounds.   Pulmonary:      Effort: Pulmonary effort is normal. No respiratory distress.      Breath sounds: Normal breath sounds. No wheezing or rales.   Musculoskeletal:         General: Normal range of motion.   Lymphadenopathy:      Cervical: No cervical adenopathy.   Skin:     General: Skin is warm.   Neurological:      General: No focal deficit present.      Mental Status: She is alert and oriented to person, place, and time.   Psychiatric:         Mood and Affect: Mood normal.         Behavior: Behavior normal.         NM CT parathyroid SPECT  Order: [id number]  Impression    IMPRESSION:    No suspicious sites of abnormal parathyroid tissue identified.            : TERESSA    Transcribe Date/Time: Mar 26 2024  1:01P    Dictated by : PATSY PAGE MD    This examination was interpreted and the report reviewed and  electronically signed by:  PATSY PAGE MD on Mar 26 2024  1:14PM  EST  Narrative    * * *Final Report* * *    DATE OF EXAM: Mar 26 2024 11:56AM      N   [id number]  -  NM PARATHYROID W SPECT/CT  / ACCESSION #  [id number]    PROCEDURE REASON: multiple diagnoses        * * * * Physician Interpretation * * * *     PARATHYROID SCAN ( ):    HISTORY: Hyperparathyroidism.    TECHNIQUE: 390 microcuries of 123-I sodium iodide PO, followed by thyroid   scan. 31 mCi Tc-99m sestamibi was given IV.  SPECT imaging of the neck and chest was  performed; anatomic mapping  noncontrast CT imaging of the same body region was performed using 1 bed  (39cm).    CT Dose-Length Product (DLP): 194 mGy*cm.  CT Dose Reduction Employed: Yes    RESULT:      The I-123 images demonstrate homogeneous uptake of activity by the  thyroid, without focal thyroid abnormalities identified.    SPECT images demonstrate no suspicious focus of residual post-subtraction  sestamibi activity in the neck.    No other areas of abnormal residual post-subtraction sestamibi activity  are seen to suggest other possible sites for abnormal parathyroid tissue.    I did review her sestamibi scan results with her and informed her that up to 15 to 20% of scans can be negative despite patient's having biochemically positive disease for hyperparathyroidism.  Some of this can relate to gland size and/or disease pathology adenoma versus 4 gland hyperplasia.    Patient ID: Qian Sanz is a 69 y.o. female.    General    Date/Time: 11/13/2024 11:39 AM    Performed by: Juan David Man MD  Authorized by: Juan David Man MD    Consent:     Consent obtained:  Verbal    Consent given by:  Patient    Alternatives discussed:  No treatment and observation  Universal protocol:     Procedure explained and questions answered to patient or proxy's satisfaction: yes      Relevant documents present and verified: yes      Test results available: yes    Procedure specific details:      Neck ultrasound    In the office I did a neck ultrasound with a 6-15 MHz linear ultrasound probe to look for possible parathyroid adenoma in a patient with biochemical evidence of primary hyperparathyroidism.  Her thyroid gland is somewhat small which would be consistent with her history of hypothyroidism.  There are no thyroid nodules seen.  On the right side below her thyroid gland there is a very small hypoechoic area which is seen in transverse and sagittal view.  It does have some vascular flow and this could represent a  small blood vessel versus a very small parathyroid gland with a blood vessel going to it.  This is not definitive for a parathyroid adenoma.    Images were captured and reviewed with the patient.  Post-procedure details:     Procedure completion:  Tolerated      Assessment/Plan    Mrs. Sanz has biochemical evidence that is suggestive of mild primary hyperparathyroidism.  While she is on hydrochlorothiazide she has had a mildly elevated calcium level 10.7-10.9 with a parathyroid hormone level above normal 74-77.  This would be consistent with likely primary hyperparathyroidism.  As mentioned these lab values were done while she was on hydrochlorothiazide.  In addition, many of her calcium levels in the past which were high was while she was on calcium supplementation.    She has no real significant symptoms of hyperparathyroidism although she does show evidence of osteoporosis on DEXA bone density testing.    She had a negative sestamibi scan at the [facility] for localization of a solitary parathyroid adenoma.  In office neck ultrasound today is inconclusive for a parathyroid adenoma.  There is a possible small hypoechoic area off the tip of the right thyroid lobe which could represent an adenoma but again this is not definitive.    Plan    1.   she is rather reticent to the idea of surgery at this point.  I told her that her levels are not high in a worrisome fashion on either her calcium or her PTH.  I do think would be beneficial for her to stop her hydrochlorothiazide.  This would certainly help us to distinguish her disease process and likely reduce her serum calcium levels.  I asked her to discontinue her hydrochlorothiazide for 6 weeks.  We will then repeat a calcium PTH and vitamin D level off medication.  She should also avoid any calcium supplementation in the meantime.  She needs to go back on a diuretic, her primary care physician could do something such as spironolactone or Lasix.    2.  I  will contact her once we have results back on her laboratory testing and we can make further decisions.         Juan David Man MD 11/13/24 10:50 AM

## 2024-11-15 ENCOUNTER — HOSPITAL ENCOUNTER (OUTPATIENT)
Dept: RADIOLOGY | Facility: EXTERNAL LOCATION | Age: 69
Discharge: HOME | End: 2024-11-15

## 2024-11-18 ENCOUNTER — TELEPHONE (OUTPATIENT)
Dept: PRIMARY CARE | Facility: CLINIC | Age: 69
End: 2024-11-18
Payer: MEDICARE

## 2024-11-18 DIAGNOSIS — I10 HYPERTENSION, UNSPECIFIED TYPE: ICD-10-CM

## 2024-11-18 DIAGNOSIS — Z12.31 ENCOUNTER FOR SCREENING MAMMOGRAM FOR BREAST CANCER: Primary | ICD-10-CM

## 2024-11-18 NOTE — TELEPHONE ENCOUNTER
Qian called and said if you want her to get a mammogram you will need to put in an order.  She said it has been two years.  Please advise.

## 2024-11-19 RX ORDER — HYDROCHLOROTHIAZIDE 12.5 MG/1
12.5 CAPSULE ORAL DAILY
Qty: 90 CAPSULE | Refills: 1 | Status: SHIPPED | OUTPATIENT
Start: 2024-11-19

## 2024-11-27 ENCOUNTER — TELEPHONE (OUTPATIENT)
Dept: PRIMARY CARE | Facility: CLINIC | Age: 69
End: 2024-11-27
Payer: MEDICARE

## 2024-11-27 ENCOUNTER — APPOINTMENT (OUTPATIENT)
Dept: RADIOLOGY | Facility: CLINIC | Age: 69
End: 2024-11-27
Payer: MEDICARE

## 2024-11-27 NOTE — TELEPHONE ENCOUNTER
Qian was exposed to carbon monoxide but she has had her furnace replaced yesterday.  She is feeling fine but she wants to know if she needs to have her blood checked for carbon monoxide.  Please advise.

## 2024-11-27 NOTE — TELEPHONE ENCOUNTER
Blood test not necessary, just air out the house and spend some time outside away from combustion engines   If asymptomatic, nothing to worry about

## 2024-12-17 DIAGNOSIS — I10 HYPERTENSION, UNSPECIFIED TYPE: ICD-10-CM

## 2024-12-18 RX ORDER — LOSARTAN POTASSIUM 25 MG/1
25 TABLET ORAL DAILY
Qty: 90 TABLET | Refills: 0 | Status: SHIPPED | OUTPATIENT
Start: 2024-12-18

## 2025-01-03 ENCOUNTER — LAB (OUTPATIENT)
Dept: LAB | Facility: LAB | Age: 70
End: 2025-01-03
Payer: MEDICARE

## 2025-01-03 DIAGNOSIS — E21.0 PRIMARY HYPERPARATHYROIDISM (MULTI): ICD-10-CM

## 2025-01-03 LAB
25(OH)D3 SERPL-MCNC: 25 NG/ML (ref 30–100)
CALCIUM SERPL-MCNC: 10.4 MG/DL (ref 8.6–10.3)
PTH-INTACT SERPL-MCNC: 84.6 PG/ML (ref 18.5–88)

## 2025-01-03 PROCEDURE — 82310 ASSAY OF CALCIUM: CPT

## 2025-01-03 PROCEDURE — 82306 VITAMIN D 25 HYDROXY: CPT

## 2025-01-03 PROCEDURE — 83970 ASSAY OF PARATHORMONE: CPT

## 2025-01-23 ENCOUNTER — TELEPHONE (OUTPATIENT)
Dept: SURGICAL ONCOLOGY | Facility: HOSPITAL | Age: 70
End: 2025-01-23
Payer: MEDICARE

## 2025-01-23 DIAGNOSIS — E21.0 PRIMARY HYPERPARATHYROIDISM (MULTI): ICD-10-CM

## 2025-01-23 DIAGNOSIS — E89.2 S/P PARATHYROIDECTOMY (CMS/HCC): ICD-10-CM

## 2025-01-23 NOTE — TELEPHONE ENCOUNTER
Result Communication-I had a good conversation with the patient today.  We repeated her laboratory numbers as listed below off of her hydrochlorothiazide.  With that her calcium remains minimally elevated at 1/10 of a point above the upper end of normal.  Her parathyroid hormone remains in the upper end of the normal range.  Her vitamin D levels are low which could be contributing to some secondary hyperparathyroidism.    I told her I think we can hold off on any surgery at this point.  She does not have a good surgical target.  She had a negative sestamibi scan and an inconclusive ultrasound with me in the office.    I told her she should start taking vitamin D3 2000 units daily.  I will then have my nurse order repeat lab work with calcium, PTH, and vitamin D levels in 3 months and we can monitor her.    She is very comfortable with this plan.  She would like to try to avoid surgery if possible.    Resulted Orders   Calcium   Result Value Ref Range    Calcium 10.4 (H) 8.6 - 10.3 mg/dL   Parathyroid Hormone, Intact   Result Value Ref Range    Parathyroid Hormone, Intact 84.6 18.5 - 88.0 pg/mL   Vitamin D 25-Hydroxy,Total (for eval of Vitamin D levels)   Result Value Ref Range    Vitamin D, 25-Hydroxy, Total 25 (L) 30 - 100 ng/mL    Narrative    Deficiency:         < 20   ng/ml  Insufficiency:      20-29  ng/ml  Sufficiency:         ng/ml  This assay accurately quantifies the sum of Vitamin D3, 25-Hydroxy and Vitamin D2,25-Hydroxy.       5:13 PM      Results were successfully communicated with the patient and they acknowledged their understanding.

## 2025-02-05 ENCOUNTER — OFFICE VISIT (OUTPATIENT)
Dept: PRIMARY CARE | Facility: CLINIC | Age: 70
End: 2025-02-05
Payer: MEDICARE

## 2025-02-05 VITALS
SYSTOLIC BLOOD PRESSURE: 120 MMHG | RESPIRATION RATE: 18 BRPM | BODY MASS INDEX: 23.98 KG/M2 | HEIGHT: 61 IN | WEIGHT: 127 LBS | DIASTOLIC BLOOD PRESSURE: 68 MMHG

## 2025-02-05 DIAGNOSIS — I10 PRIMARY HYPERTENSION: ICD-10-CM

## 2025-02-05 DIAGNOSIS — R31.21 ASYMPTOMATIC MICROSCOPIC HEMATURIA: ICD-10-CM

## 2025-02-05 DIAGNOSIS — G89.29 CHRONIC PAIN OF LEFT KNEE: Primary | ICD-10-CM

## 2025-02-05 DIAGNOSIS — B00.1 HERPES LABIALIS: ICD-10-CM

## 2025-02-05 DIAGNOSIS — M25.562 CHRONIC PAIN OF LEFT KNEE: Primary | ICD-10-CM

## 2025-02-05 DIAGNOSIS — I10 HYPERTENSION, UNSPECIFIED TYPE: ICD-10-CM

## 2025-02-05 PROCEDURE — 3078F DIAST BP <80 MM HG: CPT | Performed by: FAMILY MEDICINE

## 2025-02-05 PROCEDURE — 99214 OFFICE O/P EST MOD 30 MIN: CPT | Performed by: FAMILY MEDICINE

## 2025-02-05 PROCEDURE — 3074F SYST BP LT 130 MM HG: CPT | Performed by: FAMILY MEDICINE

## 2025-02-05 PROCEDURE — 1158F ADVNC CARE PLAN TLK DOCD: CPT | Performed by: FAMILY MEDICINE

## 2025-02-05 PROCEDURE — 1123F ACP DISCUSS/DSCN MKR DOCD: CPT | Performed by: FAMILY MEDICINE

## 2025-02-05 PROCEDURE — 1159F MED LIST DOCD IN RCRD: CPT | Performed by: FAMILY MEDICINE

## 2025-02-05 PROCEDURE — 1160F RVW MEDS BY RX/DR IN RCRD: CPT | Performed by: FAMILY MEDICINE

## 2025-02-05 PROCEDURE — 3008F BODY MASS INDEX DOCD: CPT | Performed by: FAMILY MEDICINE

## 2025-02-05 PROCEDURE — 1036F TOBACCO NON-USER: CPT | Performed by: FAMILY MEDICINE

## 2025-02-05 RX ORDER — LOSARTAN POTASSIUM 25 MG/1
25 TABLET ORAL DAILY
Qty: 90 TABLET | Refills: 3 | Status: SHIPPED | OUTPATIENT
Start: 2025-02-05

## 2025-02-05 RX ORDER — VALACYCLOVIR HYDROCHLORIDE 1 G/1
1000 TABLET, FILM COATED ORAL EVERY 12 HOURS PRN
Qty: 30 TABLET | Refills: 3 | Status: SHIPPED | OUTPATIENT
Start: 2025-02-05

## 2025-02-05 ASSESSMENT — PATIENT HEALTH QUESTIONNAIRE - PHQ9: 1. LITTLE INTEREST OR PLEASURE IN DOING THINGS: NOT AT ALL

## 2025-02-05 ASSESSMENT — ENCOUNTER SYMPTOMS
OCCASIONAL FEELINGS OF UNSTEADINESS: 0
DEPRESSION: 0
LOSS OF SENSATION IN FEET: 0

## 2025-02-05 NOTE — PROGRESS NOTES
"Subjective   Patient ID: Qian Sanz is a 69 y.o. female who presents for Knee Pain (Pain left knee x 1 week - no fall unsure of reason for pain).  HPI    Review of Systems  Constitutional: no chills, no fever and no night sweats.   Eyes: no blurred vision and no eyesight problems.   ENT: no hearing loss, no nasal congestion, no nasal discharge, no hoarseness and no sore throat.   Cardiovascular: no chest pain, no intermittent leg claudication, no lower extremity edema, no palpitations and no syncope.   Respiratory: no cough, no shortness of breath during exertion, no shortness of breath at rest and no wheezing.   Gastrointestinal: no abdominal pain, no blood in stools, no constipation, no diarrhea, no melena, no nausea, no rectal pain and no vomiting.   Genitourinary: no dysuria, no change in urinary frequency, no urinary hesitancy, no feelings of urinary urgency and no vaginal discharge.   Musculoskeletal: no arthralgias,  no back pain and no myalgias.   Integumentary: no new skin lesions and no rashes.   Neurological: no difficulty walking, no headache, no limb weakness, no numbness and no tingling.   Psychiatric: no anxiety, no depression, no anhedonia and no substance use disorders.   Endocrine: no recent weight gain and no recent weight loss.   Hematologic/Lymphatic: no tendency for easy bruising and no swollen glands .    Objective    Resp 18   Ht 1.549 m (5' 1\")   Wt 57.6 kg (127 lb)   BMI 24.00 kg/m²    Physical Exam  The patient appeared well nourished and normally developed. Vital signs as documented. Head exam is unremarkable. No scleral icterus or corneal arcus noted.  Pupils are equal round reactive to light extraocular movements are intact no hemorrhages noted on funduscopic exam mouth mucous membranes are moist no exudates ears canals clear TMs are gray pearly not injected nose no rhinorrhea or epistaxis Neck is without jugular venous distension, thyromegaly, or carotid bruits. Carotid " upstrokes are brisk bilaterally. Lungs are clear to auscultation and percussion. Cardiac exam reveals the PMI to be normally sized and situated. Rhythm is regular. First and second heart sounds normal. No murmurs, rubs or gallops. Abdominal exam reveals normal bowel sounds, no masses, no organomegaly and no aortic enlargement. Extremities are nonedematous and both femoral and pedal pulses are normal.  Neurologic exam DTRs are equal bilaterally no focal deficits strength is symmetrical heme lymph no palpable lymph nodes in the neck axilla or groin pain along joint line     Assessment/Plan   Problem List Items Addressed This Visit    None           Nicolette Alvarenga MD

## 2025-02-24 PROBLEM — Z98.890 POST-OPERATIVE NAUSEA AND VOMITING: Status: RESOLVED | Noted: 2024-12-30 | Resolved: 2025-02-24

## 2025-02-24 PROBLEM — B00.1 HERPES LABIALIS: Status: ACTIVE | Noted: 2025-02-24

## 2025-02-24 PROBLEM — Z86.73 HISTORY OF TIA (TRANSIENT ISCHEMIC ATTACK): Status: ACTIVE | Noted: 2024-12-30

## 2025-02-24 PROBLEM — I25.10 CORONARY ARTERY DISEASE: Status: ACTIVE | Noted: 2024-12-30

## 2025-02-24 PROBLEM — R11.2 POST-OPERATIVE NAUSEA AND VOMITING: Status: RESOLVED | Noted: 2024-12-30 | Resolved: 2025-02-24

## 2025-02-24 PROBLEM — M25.562 CHRONIC PAIN OF LEFT KNEE: Status: ACTIVE | Noted: 2025-02-24

## 2025-02-24 PROBLEM — G89.29 CHRONIC PAIN OF LEFT KNEE: Status: ACTIVE | Noted: 2025-02-24

## 2025-02-25 NOTE — ASSESSMENT & PLAN NOTE
X-ray on my read shows degenerative changes  Consider cortisone shot  Begin physical therapy  Follow-up if no improvement

## 2025-03-16 ASSESSMENT — ENCOUNTER SYMPTOMS
LOSS OF SENSATION: 0
MUSCLE WEAKNESS: 0
INABILITY TO BEAR WEIGHT: 0
TINGLING: 0
LOSS OF MOTION: 1

## 2025-03-18 ENCOUNTER — APPOINTMENT (OUTPATIENT)
Dept: PRIMARY CARE | Facility: CLINIC | Age: 70
End: 2025-03-18
Payer: MEDICARE

## 2025-03-18 VITALS
OXYGEN SATURATION: 98 % | RESPIRATION RATE: 18 BRPM | HEART RATE: 61 BPM | BODY MASS INDEX: 24.2 KG/M2 | SYSTOLIC BLOOD PRESSURE: 128 MMHG | TEMPERATURE: 97.1 F | HEIGHT: 61 IN | DIASTOLIC BLOOD PRESSURE: 80 MMHG | WEIGHT: 128.2 LBS

## 2025-03-18 DIAGNOSIS — G89.29 CHRONIC PAIN OF LEFT KNEE: Primary | ICD-10-CM

## 2025-03-18 DIAGNOSIS — M25.562 CHRONIC PAIN OF LEFT KNEE: Primary | ICD-10-CM

## 2025-03-18 PROBLEM — Z86.0100 PERSONAL HISTORY OF COLON POLYPS, UNSPECIFIED: Status: ACTIVE | Noted: 2025-03-04

## 2025-03-18 PROCEDURE — 3079F DIAST BP 80-89 MM HG: CPT | Performed by: FAMILY MEDICINE

## 2025-03-18 PROCEDURE — 1124F ACP DISCUSS-NO DSCNMKR DOCD: CPT | Performed by: FAMILY MEDICINE

## 2025-03-18 PROCEDURE — 3074F SYST BP LT 130 MM HG: CPT | Performed by: FAMILY MEDICINE

## 2025-03-18 PROCEDURE — 1036F TOBACCO NON-USER: CPT | Performed by: FAMILY MEDICINE

## 2025-03-18 PROCEDURE — 3008F BODY MASS INDEX DOCD: CPT | Performed by: FAMILY MEDICINE

## 2025-03-18 PROCEDURE — 20610 DRAIN/INJ JOINT/BURSA W/O US: CPT | Performed by: FAMILY MEDICINE

## 2025-03-18 PROCEDURE — 1160F RVW MEDS BY RX/DR IN RCRD: CPT | Performed by: FAMILY MEDICINE

## 2025-03-18 PROCEDURE — 99212 OFFICE O/P EST SF 10 MIN: CPT | Performed by: FAMILY MEDICINE

## 2025-03-18 PROCEDURE — 1159F MED LIST DOCD IN RCRD: CPT | Performed by: FAMILY MEDICINE

## 2025-03-18 RX ORDER — TRIAMCINOLONE ACETONIDE 40 MG/ML
40 INJECTION, SUSPENSION INTRA-ARTICULAR; INTRAMUSCULAR ONCE
Status: COMPLETED | OUTPATIENT
Start: 2025-03-18 | End: 2025-03-18

## 2025-03-18 RX ADMIN — TRIAMCINOLONE ACETONIDE 40 MG: 40 INJECTION, SUSPENSION INTRA-ARTICULAR; INTRAMUSCULAR at 09:43

## 2025-03-18 ASSESSMENT — ENCOUNTER SYMPTOMS
DEPRESSION: 0
LOSS OF SENSATION IN FEET: 0
OCCASIONAL FEELINGS OF UNSTEADINESS: 1

## 2025-03-18 NOTE — PROGRESS NOTES
"Subjective   Patient ID: Qian Sanz is a 70 y.o. female who presents for Knee Pain (EPV, Left knee pain x 1 month and half, requesting cortisone injection ).  Lower Extremity Issue  The symptoms are aggravated by movement.       Review of Systems  Constitutional: no chills, no fever and no night sweats.   Eyes: no blurred vision and no eyesight problems.   ENT: no hearing loss, no nasal congestion, no nasal discharge, no hoarseness and no sore throat.   Cardiovascular: no chest pain, no intermittent leg claudication, no lower extremity edema, no palpitations and no syncope.   Respiratory: no cough, no shortness of breath during exertion, no shortness of breath at rest and no wheezing.   Gastrointestinal: no abdominal pain, no blood in stools, no constipation, no diarrhea, no melena, no nausea, no rectal pain and no vomiting.   Genitourinary: no dysuria, no change in urinary frequency, no urinary hesitancy, no feelings of urinary urgency and no vaginal discharge.   Musculoskeletal: no arthralgias,  no back pain and no myalgias.   Integumentary: no new skin lesions and no rashes.   Neurological: no difficulty walking, no headache, no limb weakness, no numbness and no tingling.   Psychiatric: no anxiety, no depression, no anhedonia and no substance use disorders.   Endocrine: no recent weight gain and no recent weight loss.   Hematologic/Lymphatic: no tendency for easy bruising and no swollen glands .    Objective    /80   Pulse 61   Temp 36.2 °C (97.1 °F) (Temporal)   Resp 18   Ht 1.549 m (5' 1\")   Wt 58.2 kg (128 lb 3.2 oz)   SpO2 98%   BMI 24.22 kg/m²    Physical Exam  The patient appeared well nourished and normally developed. Vital signs as documented. Head exam is unremarkable. No scleral icterus or corneal arcus noted.  Pupils are equal round reactive to light extraocular movements are intact no hemorrhages noted on funduscopic exam mouth mucous membranes are moist no exudates ears canals " clear TMs are gray pearly not injected nose no rhinorrhea or epistaxis Neck is without jugular venous distension, thyromegaly, or carotid bruits. Carotid upstrokes are brisk bilaterally. Lungs are clear to auscultation and percussion. Cardiac exam reveals the PMI to be normally sized and situated. Rhythm is regular. First and second heart sounds normal. No murmurs, rubs or gallops. Abdominal exam reveals normal bowel sounds, no masses, no organomegaly and no aortic enlargement. Extremities are nonedematous and both femoral and pedal pulses are normal.  Neurologic exam DTRs are equal bilaterally no focal deficits strength is symmetrical heme lymph no palpable lymph nodes in the neck axilla or groin pain along joint line Patient ID: Qian Sanz is a 70 y.o. female.    Joint Injection Large/Arthrocentesis: L knee on 3/18/2025 9:43 AM  Indications: pain  Details: 24 G needle, medial approach  Medications: 40 mg triamcinolone acetonide (Kenalog-40) injection 40 mg/mL    1 cc Kenalog injected medially into the left knee patient tolerated well          Assessment/Plan   Problem List Items Addressed This Visit       Chronic pain of left knee - Primary    Relevant Medications    triamcinolone acetonide (Kenalog-40) injection 40 mg    Other Relevant Orders    Joint Injection Large/Arthrocentesis: L knee            Nicolette Alvarenga MD

## 2025-03-31 ENCOUNTER — TELEPHONE (OUTPATIENT)
Dept: PRIMARY CARE | Facility: CLINIC | Age: 70
End: 2025-03-31
Payer: MEDICARE

## 2025-03-31 NOTE — TELEPHONE ENCOUNTER
Pt wants to know what kind of knee brace she needs and where to get it from. She is an avid , does she need to stop gardening?

## 2025-04-02 NOTE — TELEPHONE ENCOUNTER
Please call patient I would like her to  a hinged knee brace from drug [place] they sell them on the wall next to the pharmacy

## 2025-04-16 ENCOUNTER — TELEPHONE (OUTPATIENT)
Dept: PRIMARY CARE | Facility: CLINIC | Age: 70
End: 2025-04-16
Payer: MEDICARE

## 2025-04-16 DIAGNOSIS — G89.29 CHRONIC PAIN OF LEFT KNEE: ICD-10-CM

## 2025-04-16 DIAGNOSIS — M79.604 RIGHT LEG PAIN: ICD-10-CM

## 2025-04-16 DIAGNOSIS — G89.29 CHRONIC PAIN OF LEFT KNEE: Primary | ICD-10-CM

## 2025-04-16 DIAGNOSIS — R29.898 BILATERAL LEG WEAKNESS: Primary | ICD-10-CM

## 2025-04-16 DIAGNOSIS — M25.562 CHRONIC PAIN OF LEFT KNEE: Primary | ICD-10-CM

## 2025-04-16 DIAGNOSIS — M25.562 CHRONIC PAIN OF LEFT KNEE: ICD-10-CM

## 2025-04-16 NOTE — TELEPHONE ENCOUNTER
Qian called stating her right knee the above and below muscles are now bothering her and she needs her referral changed so they can work her right leg and her left knee.  Please advise

## 2025-04-16 NOTE — TELEPHONE ENCOUNTER
Problem: Vaginal Birth or  Section  Goal: Fetal and maternal status remain reassuring during the birth process  Description:  Birth OB-Pregnancy care plan goal which identifies if the fetal and maternal status remain reassuring during the birth process  2022 1154 by Joni Urban RN  Outcome: Progressing     Problem: Postpartum  Goal: Experiences normal postpartum course  Description:  Postpartum OB-Pregnancy care plan goal which identifies if the mother is experiencing a normal postpartum course  2022 1154 by Joni Urban RN  Outcome: Progressing     Problem: Postpartum  Goal: Appropriate maternal -  bonding  Description:  Postpartum OB-Pregnancy care plan goal which identifies if the mother and  are bonding appropriately  2022 1154 by Joni Urban RN  Outcome: Progressing     Problem: Postpartum  Goal: Establishment of infant feeding pattern  Description:  Postpartum OB-Pregnancy care plan goal which identifies if the mother is establishing a feeding pattern with their   2022 1154 by Joni Urban RN  Outcome: Progressing     Problem: Postpartum  Goal: Incisions, wounds, or drain sites healing without S/S of infection  2022 1154 by Joni Urban RN  Outcome: Progressing     Problem: Pain  Goal: Verbalizes/displays adequate comfort level or baseline comfort level  2022 1154 by Joni Urban RN  Outcome: Progressing     Problem: Safety - Adult  Goal: Free from fall injury  2022 1154 by Joni Urban RN  Outcome: Progressing     Problem: Discharge Planning  Goal: Discharge to home or other facility with appropriate resources  2022 1154 by Joni Urban RN  Outcome: Progressing done

## 2025-04-25 LAB
25(OH)D3+25(OH)D2 SERPL-MCNC: 40 NG/ML (ref 30–100)
CALCIUM SERPL-MCNC: 11 MG/DL (ref 8.6–10.4)
PTH-INTACT SERPL-MCNC: 73 PG/ML (ref 16–77)

## 2025-04-28 ENCOUNTER — TELEPHONE (OUTPATIENT)
Dept: SURGICAL ONCOLOGY | Facility: HOSPITAL | Age: 70
End: 2025-04-28
Payer: MEDICARE

## 2025-04-28 NOTE — TELEPHONE ENCOUNTER
Result Communication-male that her laboratory values are similar to previous.  Her calcium remains mildly elevated.  Her parathyroid hormone level still remains in the normal range.  Last time this was checked it was slightly higher at about 85.  Currently she is at 73.  She was not overly excited about the idea of surgery.  Given the fact that she is asymptomatic with minimal elevation, I think she could be monitored with repeat laboratory studies every 6 months.  I told her if she is comfortable with that she can do that through her PCP or through my office.  If she is still interested in talking more about surgery I told her to call our office and we can work on that as well.  We will wait to hear back from her.    Resulted Orders   Calcium   Result Value Ref Range    CALCIUM 11.0 (H) 8.6 - 10.4 mg/dL    Narrative    FASTING:NO    FASTING: NO   Parathyroid Hormone, Intact   Result Value Ref Range    PARATHYROID HORMONE, INTACT 73 16 - 77 pg/mL      Comment:         Interpretive Guide    Intact PTH           Calcium  ------------------    ----------           -------  Normal Parathyroid    Normal               Normal  Hypoparathyroidism    Low or Low Normal    Low  Hyperparathyroidism     Primary            Normal or High       High     Secondary          High                 Normal or Low     Tertiary           High                 High  Non-Parathyroid     Hypercalcemia      Low or Low Normal    High         Narrative    FASTING:NO    FASTING: NO   Vitamin D 25-Hydroxy,Total (for eval of Vitamin D levels)   Result Value Ref Range    VITAMIN D,25-OH,TOTAL,IA 40 30 - 100 ng/mL      Comment:      Vitamin D Status         25-OH Vitamin D:     Deficiency:                    <20 ng/mL  Insufficiency:             20 - 29 ng/mL  Optimal:                 > or = 30 ng/mL     For 25-OH Vitamin D testing on patients on   D2-supplementation and patients for whom quantitation   of D2 and D3 fractions is required, the  [facility]  25-OH VIT D, (D2,D3), LC/MS/MS is recommended: order   code 14028 (patients >2yrs).     See Note 1     Note 1     For additional information, please refer to   [email or web address]   (This link is being provided for informational/  educational purposes only.)      Narrative    FASTING:NO    FASTING: NO       1:22 PM      Results were successfully communicated with the patient and they did not acknowledge their understanding.

## 2025-05-20 ENCOUNTER — APPOINTMENT (OUTPATIENT)
Dept: PRIMARY CARE | Facility: CLINIC | Age: 70
End: 2025-05-20
Payer: MEDICARE

## 2025-05-26 ENCOUNTER — OFFICE VISIT (OUTPATIENT)
Dept: URGENT CARE | Age: 70
End: 2025-05-26
Payer: MEDICARE

## 2025-05-26 VITALS
HEIGHT: 61 IN | SYSTOLIC BLOOD PRESSURE: 151 MMHG | OXYGEN SATURATION: 97 % | HEART RATE: 75 BPM | RESPIRATION RATE: 16 BRPM | TEMPERATURE: 99.6 F | BODY MASS INDEX: 22.84 KG/M2 | DIASTOLIC BLOOD PRESSURE: 71 MMHG | WEIGHT: 121 LBS

## 2025-05-26 DIAGNOSIS — J40 BRONCHITIS: Primary | ICD-10-CM

## 2025-05-26 LAB
POC CORONAVIRUS SARS-COV-2 PCR: NEGATIVE
POC HUMAN RHINOVIRUS PCR: NEGATIVE
POC INFLUENZA A VIRUS PCR: NEGATIVE
POC INFLUENZA B VIRUS PCR: NEGATIVE
POC RESPIRATORY SYNCYTIAL VIRUS PCR: NEGATIVE

## 2025-05-26 PROCEDURE — 99203 OFFICE O/P NEW LOW 30 MIN: CPT | Performed by: FAMILY MEDICINE

## 2025-05-26 PROCEDURE — 3008F BODY MASS INDEX DOCD: CPT | Performed by: FAMILY MEDICINE

## 2025-05-26 PROCEDURE — 3077F SYST BP >= 140 MM HG: CPT | Performed by: FAMILY MEDICINE

## 2025-05-26 PROCEDURE — 87631 RESP VIRUS 3-5 TARGETS: CPT | Performed by: FAMILY MEDICINE

## 2025-05-26 PROCEDURE — 1036F TOBACCO NON-USER: CPT | Performed by: FAMILY MEDICINE

## 2025-05-26 PROCEDURE — 1125F AMNT PAIN NOTED PAIN PRSNT: CPT | Performed by: FAMILY MEDICINE

## 2025-05-26 PROCEDURE — 3078F DIAST BP <80 MM HG: CPT | Performed by: FAMILY MEDICINE

## 2025-05-26 PROCEDURE — 1159F MED LIST DOCD IN RCRD: CPT | Performed by: FAMILY MEDICINE

## 2025-05-26 RX ORDER — PREDNISONE 20 MG/1
20 TABLET ORAL 2 TIMES DAILY
Qty: 6 TABLET | Refills: 0 | Status: SHIPPED | OUTPATIENT
Start: 2025-05-26 | End: 2025-05-29

## 2025-05-26 RX ORDER — DOXYCYCLINE 100 MG/1
100 CAPSULE ORAL 2 TIMES DAILY
Qty: 10 CAPSULE | Refills: 0 | Status: SHIPPED | OUTPATIENT
Start: 2025-05-26 | End: 2025-05-31

## 2025-05-26 ASSESSMENT — PAIN SCALES - GENERAL: PAINLEVEL_OUTOF10: 9

## 2025-05-26 NOTE — PROGRESS NOTES
"Subjective   Patient ID: Qian Sanz is a 70 y.o. female. They present today with a chief complaint of Cough (X 4 days), Headache, and Nasal Congestion.    Patient disposition: Home    History of Present Illness  HPI  Cough, headache, runny nose for the past 4 days.  Feels that moving into her chest.  Cough is become more productive.  Remote history of bronchitis.  No fever or chills.  No sick contacts.  Has been taking cough drops.  Coughing fits.  No GI symptoms.  No ear pain.  Headache happens after coughing fit.  No other complaints or symptoms.      Past Medical History  Allergies as of 05/26/2025 - Reviewed 05/26/2025   Allergen Reaction Noted    Nickel Rash 03/06/2023       Prescriptions Prior to Admission[1]     Current Medications[2]    Problem List[3]    Surgical History[4]     reports that she quit smoking about 37 years ago. Her smoking use included cigarettes. She has quit using smokeless tobacco. She reports that she does not currently use alcohol. She reports that she does not use drugs.    Review of Systems  As noted in HPI. ROS otherwise negative unless noted.       Objective    Vitals:    05/26/25 1457   BP: 151/71   Pulse: 75   Resp: 16   Temp: 37.6 °C (99.6 °F)   TempSrc: Oral   SpO2: 97%   Weight: 54.9 kg (121 lb)   Height: 1.549 m (5' 1\")     No LMP recorded. Patient is postmenopausal.    Physical Exam  Constitutional: vital signs reviewed. Well developed, well nourished. patient alert and patient without distress.   Head and Face: Normal and atraumatic.    Ears, Nose, Mouth, and Throat:   Hearing: Normal.  External inspection of nose: Normal.   Lips, teeth, tongue and gums: Normal and well hydrated. External inspection of ears: Normal. Ear canals and TMs: Normal.  Posterior pharynx moist, no exudate, symmetric, no abscess, and with post nasal drip.  Nasal mucosa: Congested  Lymphatic: No cervical lymphadenopathy  Neck: No neck mass was observed. Supple. normal muscle tone. "   Cardiovascular: Heart rate normal, normal S1 and S2, no gallops, no murmurs and no pericardial rub. Rhythm: Normal.  Pulmonary: No respiratory distress. Palpation of chest: Normal. Clear bilateral breath sounds.  Bronchospasm.  Wet cough, productive.  Psych: Normal mood and affect        Procedures    Point of Care Test & Imaging Results from this visit  Results for orders placed or performed in visit on 05/26/25   POCT SPOTFIRE R/ST Panel Mini w/COVID ([employer]) manually resulted    Collection Time: 05/26/25  3:27 PM    Specimen: Swab   Result Value Ref Range    POC Sars-Cov-2 PCR Negative Negative    POC Respiratory Syncytial Virus PCR Negative Negative    POC Influenza A Virus PCR Negative Negative    POC Influenza B Virus PCR Negative Negative    POC Human Rhinovirus PCR Negative Negative            Diagnostic study results (if any) were reviewed.  (If applicable) preliminary radiology reading: None    Assessment/Plan   Allergies, medications, history, and pertinent labs/EKGs/Imaging reviewed.        Medical Decision Making  See note    Orders and Diagnoses  Diagnoses and all orders for this visit:  Cough, unspecified type  -     POCT SPOTFIRE R/ST Panel Mini w/COVID ([facility]) manually resulted      Medical Admin Record      Follow Up Instructions  No follow-ups on file.    At time of discharge patient was clinically well-appearing and HDS for outpatient management. The patient and/or family was educated regarding diagnosis, supportive care, OTC and Rx medications. The patient and/or family was given the opportunity to ask questions prior to discharge and all questions answered. They verbalized understanding of my discussion of the plans for treatment, expected course, indications to return to  or seek further evaluation in ED, and the need for timely follow up as directed.      Electronically signed by [facility]           [1] (Not in a hospital admission)   [2]   Current Outpatient  Medications   Medication Sig Dispense Refill    atorvastatin (Lipitor) 10 mg tablet Take 1 tablet (10 mg) by mouth once daily. 90 tablet 3    doxycycline (Adoxa) 50 mg tablet Take 1 tablet (50 mg) by mouth 2 times a day. Take with a full glass of water and do not lie down for at least 30 minutes after.      krill oil 500 mg capsule Take 1 capsule (500 mg) by mouth once daily.      losartan (Cozaar) 25 mg tablet Take 1 tablet (25 mg) by mouth once daily. 90 tablet 3    nitroglycerin (Nitrostat) 0.4 mg SL tablet Place 1 tablet (0.4 mg) under the tongue every 5 minutes if needed.      valACYclovir (Valtrex) 1 gram tablet Take 1 tablet (1,000 mg) by mouth every 12 hours if needed (cold sores). 30 tablet 3    levothyroxine (Synthroid, Levoxyl) 50 mcg tablet Take 1 tablet (50 mcg) by mouth once daily. (Patient taking differently: Take 37.5 mcg by mouth once daily.) 90 tablet 3     No current facility-administered medications for this visit.   [3]   Patient Active Problem List  Diagnosis    Mixed hyperlipidemia    Primary hypertension    Acquired hypothyroidism    Lumbago    Vitamin D deficiency    History of colonic polyps    Asymptomatic microscopic hematuria    Prediabetes    Senile nuclear sclerosis    Posterior vitreous detachment of right eye    Lattice degeneration of peripheral retina    Medicare annual wellness visit, subsequent    Hearing loss    Ganglion of hand    Numbness and tingling of right leg    Easy bruising    Hypercalcemia    Primary osteoarthritis involving multiple joints    Diarrhea of presumed infectious origin    Primary hyperparathyroidism (Multi)    Chronic pain of left knee    Herpes labialis    Coronary artery disease    History of TIA (transient ischemic attack)    Personal history of colon polyps, unspecified   [4]   Past Surgical History:  Procedure Laterality Date    APPENDECTOMY  07/21/2014    Appendectomy    OTHER SURGICAL HISTORY  02/18/2020    Dermatological surgery    OTHER SURGICAL  HISTORY  04/19/2021    Colonoscopy    OTHER SURGICAL HISTORY  04/07/2017    Root Canal

## 2025-05-26 NOTE — PATIENT INSTRUCTIONS
Symptoms may last for up to 1-2 weeks, but follow up with PCP if your symptoms start worsening.  For muscle aches, fever, chills: Acetaminophen or Ibuprofen, Advil, or Aleve can be used.  Hydration: Maintain adequate hydration with water.  Nasal symptoms: Nasal Saline available over-the-counter, can be used 3-4 times per day  Decongestants reduce nasal congestion and discharge  Cool Mist Humidifier may loosens discharge  Cough Suppressants or expectorants may be taken over-the-counter     Use a steroid anti-inflammatory as directed  You will be started on antibiotic which will be sent to the pharmacy; please complete the regimen as directed even if your symptoms improve. It is recommended to take an Probiotic while on this medication to reduce symptoms of upset stomach, diarrhea.    A rapid PCR test was performed today including tests for Influenza A, influenza B, RSV, rhinovirus (common cold virus), Covid-19.  The results were: Negative.

## 2025-05-28 ENCOUNTER — APPOINTMENT (OUTPATIENT)
Dept: PRIMARY CARE | Facility: CLINIC | Age: 70
End: 2025-05-28
Payer: MEDICARE

## 2025-05-28 VITALS
BODY MASS INDEX: 22.96 KG/M2 | DIASTOLIC BLOOD PRESSURE: 70 MMHG | OXYGEN SATURATION: 98 % | TEMPERATURE: 96.8 F | HEIGHT: 61 IN | SYSTOLIC BLOOD PRESSURE: 128 MMHG | WEIGHT: 121.6 LBS | HEART RATE: 81 BPM
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
## 2025-05-28 DIAGNOSIS — Z86.73 HISTORY OF TIA (TRANSIENT ISCHEMIC ATTACK): ICD-10-CM

## 2025-05-28 DIAGNOSIS — R42 EPISODIC LIGHTHEADEDNESS: ICD-10-CM

## 2025-05-28 DIAGNOSIS — R26.89 BALANCE DISORDER: Primary | ICD-10-CM

## 2025-05-28 DIAGNOSIS — R73.03 PREDIABETES: ICD-10-CM

## 2025-05-28 DIAGNOSIS — R23.3 EASY BRUISING: ICD-10-CM

## 2025-05-28 DIAGNOSIS — E03.9 ACQUIRED HYPOTHYROIDISM: ICD-10-CM

## 2025-05-28 PROBLEM — K64.8 HEMORRHOIDS, INTERNAL: Status: ACTIVE | Noted: 2025-04-28

## 2025-05-28 PROBLEM — K57.90 DIVERTICULOSIS: Status: RESOLVED | Noted: 2025-04-28 | Resolved: 2025-05-28

## 2025-05-28 PROCEDURE — 1036F TOBACCO NON-USER: CPT | Performed by: FAMILY MEDICINE

## 2025-05-28 PROCEDURE — 3078F DIAST BP <80 MM HG: CPT | Performed by: FAMILY MEDICINE

## 2025-05-28 PROCEDURE — 99214 OFFICE O/P EST MOD 30 MIN: CPT | Performed by: FAMILY MEDICINE

## 2025-05-28 PROCEDURE — 1159F MED LIST DOCD IN RCRD: CPT | Performed by: FAMILY MEDICINE

## 2025-05-28 PROCEDURE — 1160F RVW MEDS BY RX/DR IN RCRD: CPT | Performed by: FAMILY MEDICINE

## 2025-05-28 PROCEDURE — 3008F BODY MASS INDEX DOCD: CPT | Performed by: FAMILY MEDICINE

## 2025-05-28 PROCEDURE — 3074F SYST BP LT 130 MM HG: CPT | Performed by: FAMILY MEDICINE

## 2025-05-28 ASSESSMENT — PATIENT HEALTH QUESTIONNAIRE - PHQ9
1. LITTLE INTEREST OR PLEASURE IN DOING THINGS: NOT AT ALL
2. FEELING DOWN, DEPRESSED OR HOPELESS: NOT AT ALL
SUM OF ALL RESPONSES TO PHQ9 QUESTIONS 1 AND 2: 0

## 2025-05-28 NOTE — PROGRESS NOTES
Subjective   Patient ID: Qian Sanz is a 70 y.o. female who presents for Balance Problem (Patient presents with knots on head from bumping her head on a cabinet and has not fallen. Patient also would like to get tested for lyme disease from getting bit by tick. Patient also states her balance is off. Also would like to ask about why her chart states she is pre-diabetic. Patient wants to know if she can reduce her losartan medication. ) and reduce levothyroxine.  Tick bit very  concerned  Balance is off  Feels unsteady when she walks  It has been bad since she went to the emergency room all of this started after 2023 she thinks she may have had a TIA or small stroke at that time there was a difference of opinion between the [facility] and  neurologist at that time  Ever since then has noticed worsening balance is afraid something was missed  Has episodic feelings of lightheadedness and is just noticing that her stamina is not what it used to be  Also notices easy bruising        Review of Systems  Constitutional: no chills, no fever and no night sweats.   Eyes: no blurred vision and no eyesight problems.   ENT: no hearing loss, no nasal congestion, no nasal discharge, no hoarseness and no sore throat.   Cardiovascular: no chest pain, no intermittent leg claudication, no lower extremity edema, no palpitations and no syncope.   Respiratory: no cough, no shortness of breath during exertion, no shortness of breath at rest and no wheezing.   Gastrointestinal: no abdominal pain, no blood in stools, no constipation, no diarrhea, no melena, no nausea, no rectal pain and no vomiting.   Genitourinary: no dysuria, no change in urinary frequency, no urinary hesitancy, no feelings of urinary urgency and no vaginal discharge.   Musculoskeletal: no arthralgias,  no back pain and no myalgias.   Integumentary: no new skin lesions and no rashes.   Neurological: no difficulty walking, no headache, no limb weakness, no  "numbness and no tingling.   Psychiatric: no anxiety, no depression, no anhedonia and no substance use disorders.   Endocrine: no recent weight gain and no recent weight loss.   Hematologic/Lymphatic: no tendency for easy bruising and no swollen glands .    Objective    /70   Pulse 81   Temp 36 °C (96.8 °F)   Ht 1.549 m (5' 1\")   Wt 55.2 kg (121 lb 9.6 oz)   SpO2 98%   BMI 22.98 kg/m²    Physical Exam  The patient appeared well nourished and normally developed. Vital signs as documented. Head exam is unremarkable. No scleral icterus or corneal arcus noted.  Pupils are equal round reactive to light extraocular movements are intact no hemorrhages noted on funduscopic exam mouth mucous membranes are moist no exudates ears canals clear TMs are gray pearly not injected nose no rhinorrhea or epistaxis Neck is without jugular venous distension, thyromegaly, or carotid bruits. Carotid upstrokes are brisk bilaterally. Lungs are clear to auscultation and percussion. Cardiac exam reveals the PMI to be normally sized and situated. Rhythm is regular. First and second heart sounds normal. No murmurs, rubs or gallops. Abdominal exam reveals normal bowel sounds, no masses, no organomegaly and no aortic enlargement. Extremities are nonedematous and both femoral and pedal pulses are normal.  Neurologic exam DTRs are equal bilaterally no focal deficits strength is symmetrical heme lymph no palpable lymph nodes in the neck axilla or groin    Assessment/Plan   Problem List Items Addressed This Visit       Acquired hypothyroidism    Relevant Orders    TSH (Completed)    Prediabetes    Relevant Orders    Hemoglobin A1C (Completed)    Easy bruising    Relevant Orders    CBC and Auto Differential (Completed)    History of TIA (transient ischemic attack)    Relevant Orders    CT angio head and neck w and wo IV contrast    Creatinine, Serum (Completed)    Tick bite    Relevant Orders    LYME (B. BURGDORFERI) AB MODIFIED 2-TITER " TESTING, WITH REFLEX TO IGM AND IGG BY NIKKI (Completed)    Balance disorder - Primary    Relevant Orders    Holter or Event Cardiac Monitor    Episodic lightheadedness            Nicolette Alvarenga MD

## 2025-05-29 ENCOUNTER — APPOINTMENT (OUTPATIENT)
Dept: PRIMARY CARE | Facility: CLINIC | Age: 70
End: 2025-05-29
Payer: MEDICARE

## 2025-05-30 LAB
B BURGDOR IGG+IGM SER QL IA: <=0.9 INDEX
BASOPHILS # BLD AUTO: 23 CELLS/UL (ref 0–200)
BASOPHILS NFR BLD AUTO: 0.2 %
CREAT SERPL-MCNC: 0.85 MG/DL (ref 0.6–1)
EGFRCR SERPLBLD CKD-EPI 2021: 73 ML/MIN/1.73M2
EOSINOPHIL # BLD AUTO: 0 CELLS/UL (ref 15–500)
EOSINOPHIL NFR BLD AUTO: 0 %
ERYTHROCYTE [DISTWIDTH] IN BLOOD BY AUTOMATED COUNT: 11.8 % (ref 11–15)
EST. AVERAGE GLUCOSE BLD GHB EST-MCNC: 114 MG/DL
EST. AVERAGE GLUCOSE BLD GHB EST-SCNC: 6.3 MMOL/L
HBA1C MFR BLD: 5.6 %
HCT VFR BLD AUTO: 40.9 % (ref 35–45)
HGB BLD-MCNC: 13.3 G/DL (ref 11.7–15.5)
LYMPHOCYTES # BLD AUTO: 958 CELLS/UL (ref 850–3900)
LYMPHOCYTES NFR BLD AUTO: 8.4 %
MCH RBC QN AUTO: 31.1 PG (ref 27–33)
MCHC RBC AUTO-ENTMCNC: 32.5 G/DL (ref 32–36)
MCV RBC AUTO: 95.8 FL (ref 80–100)
MONOCYTES # BLD AUTO: 559 CELLS/UL (ref 200–950)
MONOCYTES NFR BLD AUTO: 4.9 %
NEUTROPHILS # BLD AUTO: 9861 CELLS/UL (ref 1500–7800)
NEUTROPHILS NFR BLD AUTO: 86.5 %
PLATELET # BLD AUTO: 274 THOUSAND/UL (ref 140–400)
PMV BLD REES-ECKER: 10 FL (ref 7.5–12.5)
RBC # BLD AUTO: 4.27 MILLION/UL (ref 3.8–5.1)
TSH SERPL-ACNC: 0.73 MIU/L (ref 0.4–4.5)
WBC # BLD AUTO: 11.4 THOUSAND/UL (ref 3.8–10.8)

## 2025-06-01 PROBLEM — R42 EPISODIC LIGHTHEADEDNESS: Status: ACTIVE | Noted: 2025-06-01

## 2025-06-01 PROBLEM — R26.89 BALANCE DISORDER: Status: ACTIVE | Noted: 2025-06-01
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
## 2025-06-06 ENCOUNTER — APPOINTMENT (OUTPATIENT)
Dept: RADIOLOGY | Facility: CLINIC | Age: 70
End: 2025-06-06
Payer: MEDICARE

## 2025-06-10 ENCOUNTER — HOSPITAL ENCOUNTER (OUTPATIENT)
Dept: RADIOLOGY | Facility: CLINIC | Age: 70
Discharge: HOME | End: 2025-06-10
Payer: MEDICARE

## 2025-06-10 DIAGNOSIS — Z86.73 HISTORY OF TIA (TRANSIENT ISCHEMIC ATTACK): ICD-10-CM

## 2025-06-10 PROCEDURE — 70498 CT ANGIOGRAPHY NECK: CPT

## 2025-06-10 PROCEDURE — 2550000001 HC RX 255 CONTRASTS: Performed by: FAMILY MEDICINE

## 2025-06-10 RX ADMIN — IOHEXOL 70 ML: 350 INJECTION, SOLUTION INTRAVENOUS at 15:25

## 2025-06-17 ENCOUNTER — OFFICE VISIT (OUTPATIENT)
Dept: PRIMARY CARE | Facility: CLINIC | Age: 70
End: 2025-06-17
Payer: MEDICARE

## 2025-06-17 VITALS
DIASTOLIC BLOOD PRESSURE: 73 MMHG | WEIGHT: 120 LBS | HEIGHT: 61 IN | OXYGEN SATURATION: 98 % | BODY MASS INDEX: 22.66 KG/M2 | HEART RATE: 67 BPM | SYSTOLIC BLOOD PRESSURE: 134 MMHG | TEMPERATURE: 98 F

## 2025-06-17 DIAGNOSIS — H00.036 EYELID CELLULITIS, LEFT: Primary | ICD-10-CM

## 2025-06-17 DIAGNOSIS — H10.32 ACUTE BACTERIAL CONJUNCTIVITIS OF LEFT EYE: ICD-10-CM

## 2025-06-17 DIAGNOSIS — B00.1 HERPES LABIALIS: ICD-10-CM

## 2025-06-17 PROCEDURE — 1160F RVW MEDS BY RX/DR IN RCRD: CPT | Performed by: FAMILY MEDICINE

## 2025-06-17 PROCEDURE — 3008F BODY MASS INDEX DOCD: CPT | Performed by: FAMILY MEDICINE

## 2025-06-17 PROCEDURE — 1159F MED LIST DOCD IN RCRD: CPT | Performed by: FAMILY MEDICINE

## 2025-06-17 PROCEDURE — 3078F DIAST BP <80 MM HG: CPT | Performed by: FAMILY MEDICINE

## 2025-06-17 PROCEDURE — 1036F TOBACCO NON-USER: CPT | Performed by: FAMILY MEDICINE

## 2025-06-17 PROCEDURE — 3075F SYST BP GE 130 - 139MM HG: CPT | Performed by: FAMILY MEDICINE

## 2025-06-17 PROCEDURE — 99213 OFFICE O/P EST LOW 20 MIN: CPT | Performed by: FAMILY MEDICINE

## 2025-06-17 RX ORDER — VALACYCLOVIR HYDROCHLORIDE 1 G/1
1000 TABLET, FILM COATED ORAL EVERY 12 HOURS PRN
Qty: 30 TABLET | Refills: 3 | Status: SHIPPED | OUTPATIENT
Start: 2025-06-17

## 2025-06-17 RX ORDER — CEPHALEXIN 500 MG/1
500 CAPSULE ORAL 2 TIMES DAILY
Qty: 8 CAPSULE | Refills: 0 | Status: SHIPPED | OUTPATIENT
Start: 2025-06-17 | End: 2025-06-21

## 2025-06-17 RX ORDER — MOXIFLOXACIN 5 MG/ML
1 SOLUTION/ DROPS OPHTHALMIC 3 TIMES DAILY
Qty: 3 ML | Refills: 1 | Status: SHIPPED | OUTPATIENT
Start: 2025-06-17

## 2025-06-17 NOTE — PROGRESS NOTES
"Subjective   Patient ID: Qian Sanz is a 70 y.o. female who presents for Eye Problem (Pt presents with L eye injury/Pt was in the yard).  HPI    Review of Systems    Objective    /73   Pulse 67   Temp 36.7 °C (98 °F)   Ht 1.549 m (5' 1\")   Wt 54.4 kg (120 lb)   SpO2 98%   BMI 22.67 kg/m²    Physical Exam    Assessment/Plan   Problem List Items Addressed This Visit       Herpes labialis            Nicolette Alvarenga MD    " rhinorrhea or epistaxis Neck is without jugular venous distension, thyromegaly, or carotid bruits. Carotid upstrokes are brisk bilaterally. Lungs are clear to auscultation and percussion. Cardiac exam reveals the PMI to be normally sized and situated. Rhythm is regular. First and second heart sounds normal. No murmurs, rubs or gallops. Abdominal exam reveals normal bowel sounds, no masses, no organomegaly and no aortic enlargement. Extremities are nonedematous and both femoral and pedal pulses are normal.  Neurologic exam DTRs are equal bilaterally no focal deficits strength is symmetrical heme lymph no palpable lymph nodes in the neck axilla or groin    Assessment/Plan   Problem List Items Addressed This Visit       Herpes labialis    Relevant Medications    valACYclovir (Valtrex) 1 gram tablet    Eyelid cellulitis, left - Primary    Acute bacterial conjunctivitis of left eye    Relevant Medications    moxifloxacin (Vigamox) 0.5 % ophthalmic solution            Nicolette Alvarenga MD

## 2025-06-30 PROBLEM — H00.036 EYELID CELLULITIS, LEFT: Status: ACTIVE | Noted: 2025-06-30

## 2025-06-30 PROBLEM — H10.32 ACUTE BACTERIAL CONJUNCTIVITIS OF LEFT EYE: Status: ACTIVE | Noted: 2025-06-30

## 2025-08-21 DIAGNOSIS — E78.2 MIXED HYPERLIPIDEMIA: ICD-10-CM

## 2025-08-22 RX ORDER — ATORVASTATIN CALCIUM 10 MG/1
10 TABLET, FILM COATED ORAL DAILY
Qty: 90 TABLET | Refills: 0 | Status: SHIPPED | OUTPATIENT
Start: 2025-08-22

## 2025-09-23 ENCOUNTER — APPOINTMENT (OUTPATIENT)
Dept: PRIMARY CARE | Facility: CLINIC | Age: 70
End: 2025-09-23
Payer: MEDICARE